# Patient Record
Sex: MALE | Race: WHITE | NOT HISPANIC OR LATINO
[De-identification: names, ages, dates, MRNs, and addresses within clinical notes are randomized per-mention and may not be internally consistent; named-entity substitution may affect disease eponyms.]

---

## 2017-04-19 ENCOUNTER — RECORD ABSTRACTING (OUTPATIENT)
Age: 72
End: 2017-04-19

## 2017-04-19 DIAGNOSIS — Z78.9 OTHER SPECIFIED HEALTH STATUS: ICD-10-CM

## 2017-04-19 DIAGNOSIS — C14.0 MALIGNANT NEOPLASM OF PHARYNX, UNSPECIFIED: ICD-10-CM

## 2017-04-19 RX ORDER — ATORVASTATIN CALCIUM 80 MG/1
TABLET, FILM COATED ORAL
Refills: 0 | Status: ACTIVE | COMMUNITY

## 2017-04-19 RX ORDER — ASPIRIN 325 MG/1
TABLET, FILM COATED ORAL
Refills: 0 | Status: ACTIVE | COMMUNITY

## 2017-06-14 ENCOUNTER — APPOINTMENT (OUTPATIENT)
Dept: UROLOGY | Facility: CLINIC | Age: 72
End: 2017-06-14

## 2017-06-14 VITALS
SYSTOLIC BLOOD PRESSURE: 122 MMHG | WEIGHT: 180 LBS | BODY MASS INDEX: 29.99 KG/M2 | HEART RATE: 81 BPM | HEIGHT: 65 IN | DIASTOLIC BLOOD PRESSURE: 71 MMHG

## 2017-12-06 ENCOUNTER — APPOINTMENT (OUTPATIENT)
Dept: UROLOGY | Facility: CLINIC | Age: 72
End: 2017-12-06
Payer: MEDICARE

## 2017-12-06 VITALS
BODY MASS INDEX: 29.99 KG/M2 | HEART RATE: 90 BPM | DIASTOLIC BLOOD PRESSURE: 84 MMHG | WEIGHT: 180 LBS | HEIGHT: 65 IN | SYSTOLIC BLOOD PRESSURE: 148 MMHG

## 2017-12-06 DIAGNOSIS — Z00.00 ENCOUNTER FOR GENERAL ADULT MEDICAL EXAMINATION W/OUT ABNORMAL FINDINGS: ICD-10-CM

## 2017-12-06 PROCEDURE — 99213 OFFICE O/P EST LOW 20 MIN: CPT

## 2017-12-06 RX ORDER — FEXOFENADINE HCL 60 MG
TABLET ORAL
Refills: 0 | Status: ACTIVE | COMMUNITY

## 2018-04-23 ENCOUNTER — APPOINTMENT (OUTPATIENT)
Dept: UROLOGY | Facility: CLINIC | Age: 73
End: 2018-04-23
Payer: MEDICARE

## 2018-04-23 VITALS
HEART RATE: 80 BPM | HEIGHT: 65 IN | SYSTOLIC BLOOD PRESSURE: 117 MMHG | BODY MASS INDEX: 29.99 KG/M2 | DIASTOLIC BLOOD PRESSURE: 63 MMHG | WEIGHT: 180 LBS

## 2018-04-23 PROCEDURE — 99213 OFFICE O/P EST LOW 20 MIN: CPT

## 2018-04-23 RX ORDER — SULFAMETHOXAZOLE AND TRIMETHOPRIM 800; 160 MG/1; MG/1
800-160 TABLET ORAL
Qty: 3 | Refills: 0 | Status: ACTIVE | COMMUNITY
Start: 2018-04-23 | End: 1900-01-01

## 2018-05-22 ENCOUNTER — OUTPATIENT (OUTPATIENT)
Dept: OUTPATIENT SERVICES | Facility: HOSPITAL | Age: 73
LOS: 1 days | Discharge: HOME | End: 2018-05-22

## 2018-05-22 ENCOUNTER — APPOINTMENT (OUTPATIENT)
Dept: UROLOGY | Facility: CLINIC | Age: 73
End: 2018-05-22
Payer: MEDICARE

## 2018-05-22 ENCOUNTER — LABORATORY RESULT (OUTPATIENT)
Age: 73
End: 2018-05-22

## 2018-05-22 VITALS
SYSTOLIC BLOOD PRESSURE: 129 MMHG | HEIGHT: 65 IN | DIASTOLIC BLOOD PRESSURE: 77 MMHG | BODY MASS INDEX: 29.99 KG/M2 | WEIGHT: 180 LBS | HEART RATE: 77 BPM

## 2018-05-22 DIAGNOSIS — N40.1 BENIGN PROSTATIC HYPERPLASIA WITH LOWER URINARY TRACT SYMPMS: ICD-10-CM

## 2018-05-22 DIAGNOSIS — N13.8 BENIGN PROSTATIC HYPERPLASIA WITH LOWER URINARY TRACT SYMPMS: ICD-10-CM

## 2018-05-22 DIAGNOSIS — R35.1 NOCTURIA: ICD-10-CM

## 2018-05-22 PROCEDURE — 99213 OFFICE O/P EST LOW 20 MIN: CPT | Mod: 25

## 2018-05-22 PROCEDURE — 76872 US TRANSRECTAL: CPT

## 2018-05-22 PROCEDURE — 55700: CPT

## 2018-05-23 DIAGNOSIS — R89.7 ABNORMAL HISTOLOGICAL FINDINGS IN SPECIMENS FROM OTHER ORGANS, SYSTEMS AND TISSUES: ICD-10-CM

## 2018-06-04 ENCOUNTER — APPOINTMENT (OUTPATIENT)
Dept: UROLOGY | Facility: CLINIC | Age: 73
End: 2018-06-04
Payer: MEDICARE

## 2018-06-04 VITALS
BODY MASS INDEX: 29.99 KG/M2 | DIASTOLIC BLOOD PRESSURE: 77 MMHG | SYSTOLIC BLOOD PRESSURE: 120 MMHG | HEIGHT: 65 IN | WEIGHT: 180 LBS | HEART RATE: 68 BPM

## 2018-06-04 DIAGNOSIS — R97.20 ELEVATED PROSTATE, SPECIFIC ANTIGEN [PSA]: ICD-10-CM

## 2018-06-04 DIAGNOSIS — C61 MALIGNANT NEOPLASM OF PROSTATE: ICD-10-CM

## 2018-06-04 PROCEDURE — 99213 OFFICE O/P EST LOW 20 MIN: CPT

## 2018-06-04 NOTE — HISTORY OF PRESENT ILLNESS
[Urinary Frequency] : urinary frequency [Nocturia] : nocturia [Straining] : straining [None] : None [FreeTextEntry1] : FLOWER AGUSTIN is a 72 year old male with a past medical history of elevated PSA since 2016 and has refused prostate biopsies in  the past because he states his brother has had 12 biopsies and the results are always negative.Last PSA 6.0 , 13 % free PSA on 4/18/2018 . Presents to the office today for a follow up, s/p prostate biopsy on 5/22/2018. Patient denies any blood in stool or urine, states that he did notice brown color in the semen 1 time, 5 days ago. No fever or chills. Finished antibiotics that were ordered. Patient is here to review results of prostate biopsy. \par Prostate biopsy 5/22 /2018.- Prostatic adenocarcinoma 2/12 cores\par -Right lateral apex.- Prostatic adenocarcinoma, [place] score 7 (3+4), involving approx 65 % of material. Diagnosis confirmed by prostate triple stain. \par -Right apex.- Prostatic adenocarcinoma, [place] score 7 (3+4), pending immunostains\par

## 2018-06-04 NOTE — ASSESSMENT
[FreeTextEntry1] : FLOWER AGUSTIN is a 72 year old male with a past medical history of elevated PSA since 2016 and has refused prostate biopsies in  the past because he states his brother has had 12 biopsies and the results are always negative.Last PSA 6.0 , 13 % free PSA on 4/18/2018 . Presents to the office today for a follow up, s/p prostate biopsy on 5/22/2018. Patient denies any blood in stool or urine, states that he did notice brown color in the semen 1 time, 5 days ago. No fever or chills. Finished antibiotics that were ordered. Patient is here to review results of prostate biopsy. \par Prostate biopsy 5/22 /2018.- Prostatic adenocarcinoma 2/12 cores\par -Right lateral apex.- Prostatic adenocarcinoma, [place] score 7 (3+4), involving approx 65 % of material. Diagnosis confirmed by prostate triple stain. \par -Right apex.- Prostatic adenocarcinoma, [place] score 7 (3+4), pending immunostains

## 2018-06-04 NOTE — LETTER HEADER
[Ralph Alicea MD] : Ralph Alicea MD [[address]] : [address] [[address]] : [address] [[place] [address]] : [place] [address] [Tel [phone]] : Tel [phone] [Fax [phone]] : Fax [phone]

## 2018-06-04 NOTE — LETTER BODY
[Dear  ___] : Dear  [unfilled], [Courtesy Letter:] : I had the pleasure of seeing your patient, [unfilled], in my office today. [Please see my note below.] : Please see my note below. [Sincerely,] : Sincerely, [Consult Closing:] : Thank you very much for allowing me to participate in the care of this patient.  If you have any questions, please do not hesitate to contact me. [FreeTextEntry2] : Dr. Itz Sanchez\par [address]\[place] [address] [FreeTextEntry3] : Ralph Alicea MD\par Director of Male Sexual Dysfunction and Urologic Prosthetics

## 2018-06-20 ENCOUNTER — OUTPATIENT (OUTPATIENT)
Dept: OUTPATIENT SERVICES | Facility: HOSPITAL | Age: 73
LOS: 1 days | Discharge: HOME | End: 2018-06-20

## 2018-06-20 ENCOUNTER — APPOINTMENT (OUTPATIENT)
Dept: UROLOGY | Facility: CLINIC | Age: 73
End: 2018-06-20
Payer: MEDICARE

## 2018-06-20 VITALS
WEIGHT: 188 LBS | HEART RATE: 73 BPM | TEMPERATURE: 98 F | BODY MASS INDEX: 31.32 KG/M2 | DIASTOLIC BLOOD PRESSURE: 83 MMHG | RESPIRATION RATE: 18 BRPM | HEIGHT: 65 IN | SYSTOLIC BLOOD PRESSURE: 148 MMHG

## 2018-06-20 DIAGNOSIS — C61 MALIGNANT NEOPLASM OF PROSTATE: ICD-10-CM

## 2018-06-20 PROCEDURE — 99213 OFFICE O/P EST LOW 20 MIN: CPT

## 2018-06-21 DIAGNOSIS — C61 MALIGNANT NEOPLASM OF PROSTATE: ICD-10-CM

## 2018-08-09 ENCOUNTER — APPOINTMENT (OUTPATIENT)
Dept: UROLOGY | Facility: CLINIC | Age: 73
End: 2018-08-09

## 2021-08-21 ENCOUNTER — INPATIENT (INPATIENT)
Facility: HOSPITAL | Age: 76
LOS: 4 days | Discharge: HOME HEALTH | End: 2021-08-26
Attending: INTERNAL MEDICINE | Admitting: INTERNAL MEDICINE
Payer: MEDICARE

## 2021-08-21 VITALS
DIASTOLIC BLOOD PRESSURE: 76 MMHG | RESPIRATION RATE: 19 BRPM | WEIGHT: 179.02 LBS | OXYGEN SATURATION: 99 % | HEART RATE: 80 BPM | SYSTOLIC BLOOD PRESSURE: 146 MMHG

## 2021-08-21 DIAGNOSIS — Z90.79 ACQUIRED ABSENCE OF OTHER GENITAL ORGAN(S): Chronic | ICD-10-CM

## 2021-08-21 DIAGNOSIS — Z96.642 PRESENCE OF LEFT ARTIFICIAL HIP JOINT: Chronic | ICD-10-CM

## 2021-08-21 LAB
ALBUMIN SERPL ELPH-MCNC: 4.5 G/DL — SIGNIFICANT CHANGE UP (ref 3.5–5.2)
ALP SERPL-CCNC: 75 U/L — SIGNIFICANT CHANGE UP (ref 30–115)
ALT FLD-CCNC: 11 U/L — SIGNIFICANT CHANGE UP (ref 0–41)
ANION GAP SERPL CALC-SCNC: 11 MMOL/L — SIGNIFICANT CHANGE UP (ref 7–14)
AST SERPL-CCNC: 16 U/L — SIGNIFICANT CHANGE UP (ref 0–41)
BASOPHILS # BLD AUTO: 0.04 K/UL — SIGNIFICANT CHANGE UP (ref 0–0.2)
BASOPHILS NFR BLD AUTO: 0.6 % — SIGNIFICANT CHANGE UP (ref 0–1)
BILIRUB SERPL-MCNC: 0.2 MG/DL — SIGNIFICANT CHANGE UP (ref 0.2–1.2)
BUN SERPL-MCNC: 16 MG/DL — SIGNIFICANT CHANGE UP (ref 10–20)
CALCIUM SERPL-MCNC: 9.7 MG/DL — SIGNIFICANT CHANGE UP (ref 8.5–10.1)
CHLORIDE SERPL-SCNC: 104 MMOL/L — SIGNIFICANT CHANGE UP (ref 98–110)
CO2 SERPL-SCNC: 26 MMOL/L — SIGNIFICANT CHANGE UP (ref 17–32)
CREAT SERPL-MCNC: 1.2 MG/DL — SIGNIFICANT CHANGE UP (ref 0.7–1.5)
EOSINOPHIL # BLD AUTO: 0.22 K/UL — SIGNIFICANT CHANGE UP (ref 0–0.7)
EOSINOPHIL NFR BLD AUTO: 3.1 % — SIGNIFICANT CHANGE UP (ref 0–8)
GLUCOSE SERPL-MCNC: 132 MG/DL — HIGH (ref 70–99)
HCT VFR BLD CALC: 44.8 % — SIGNIFICANT CHANGE UP (ref 42–52)
HGB BLD-MCNC: 14.7 G/DL — SIGNIFICANT CHANGE UP (ref 14–18)
IMM GRANULOCYTES NFR BLD AUTO: 0.4 % — HIGH (ref 0.1–0.3)
LIDOCAIN IGE QN: 52 U/L — SIGNIFICANT CHANGE UP (ref 7–60)
LYMPHOCYTES # BLD AUTO: 1.26 K/UL — SIGNIFICANT CHANGE UP (ref 1.2–3.4)
LYMPHOCYTES # BLD AUTO: 18 % — LOW (ref 20.5–51.1)
MAGNESIUM SERPL-MCNC: 1.9 MG/DL — SIGNIFICANT CHANGE UP (ref 1.8–2.4)
MCHC RBC-ENTMCNC: 30.6 PG — SIGNIFICANT CHANGE UP (ref 27–31)
MCHC RBC-ENTMCNC: 32.8 G/DL — SIGNIFICANT CHANGE UP (ref 32–37)
MCV RBC AUTO: 93.3 FL — SIGNIFICANT CHANGE UP (ref 80–94)
MONOCYTES # BLD AUTO: 0.7 K/UL — HIGH (ref 0.1–0.6)
MONOCYTES NFR BLD AUTO: 10 % — HIGH (ref 1.7–9.3)
NEUTROPHILS # BLD AUTO: 4.74 K/UL — SIGNIFICANT CHANGE UP (ref 1.4–6.5)
NEUTROPHILS NFR BLD AUTO: 67.9 % — SIGNIFICANT CHANGE UP (ref 42.2–75.2)
NRBC # BLD: 0 /100 WBCS — SIGNIFICANT CHANGE UP (ref 0–0)
NT-PROBNP SERPL-SCNC: 66 PG/ML — SIGNIFICANT CHANGE UP (ref 0–300)
PLATELET # BLD AUTO: 201 K/UL — SIGNIFICANT CHANGE UP (ref 130–400)
POTASSIUM SERPL-MCNC: 4.1 MMOL/L — SIGNIFICANT CHANGE UP (ref 3.5–5)
POTASSIUM SERPL-SCNC: 4.1 MMOL/L — SIGNIFICANT CHANGE UP (ref 3.5–5)
PROT SERPL-MCNC: 6.9 G/DL — SIGNIFICANT CHANGE UP (ref 6–8)
RBC # BLD: 4.8 M/UL — SIGNIFICANT CHANGE UP (ref 4.7–6.1)
RBC # FLD: 12.4 % — SIGNIFICANT CHANGE UP (ref 11.5–14.5)
SARS-COV-2 RNA SPEC QL NAA+PROBE: SIGNIFICANT CHANGE UP
SODIUM SERPL-SCNC: 141 MMOL/L — SIGNIFICANT CHANGE UP (ref 135–146)
TROPONIN T SERPL-MCNC: 0.12 NG/ML — CRITICAL HIGH
TROPONIN T SERPL-MCNC: 0.26 NG/ML — CRITICAL HIGH
TROPONIN T SERPL-MCNC: <0.01 NG/ML — SIGNIFICANT CHANGE UP
WBC # BLD: 6.99 K/UL — SIGNIFICANT CHANGE UP (ref 4.8–10.8)
WBC # FLD AUTO: 6.99 K/UL — SIGNIFICANT CHANGE UP (ref 4.8–10.8)

## 2021-08-21 PROCEDURE — 93010 ELECTROCARDIOGRAM REPORT: CPT

## 2021-08-21 PROCEDURE — 99223 1ST HOSP IP/OBS HIGH 75: CPT

## 2021-08-21 PROCEDURE — 99285 EMERGENCY DEPT VISIT HI MDM: CPT

## 2021-08-21 PROCEDURE — 71045 X-RAY EXAM CHEST 1 VIEW: CPT | Mod: 26

## 2021-08-21 RX ORDER — ENOXAPARIN SODIUM 100 MG/ML
80 INJECTION SUBCUTANEOUS
Refills: 0 | Status: DISCONTINUED | OUTPATIENT
Start: 2021-08-21 | End: 2021-08-22

## 2021-08-21 RX ORDER — ASPIRIN/CALCIUM CARB/MAGNESIUM 324 MG
0 TABLET ORAL
Qty: 0 | Refills: 0 | DISCHARGE

## 2021-08-21 RX ORDER — ASPIRIN/CALCIUM CARB/MAGNESIUM 324 MG
81 TABLET ORAL DAILY
Refills: 0 | Status: DISCONTINUED | OUTPATIENT
Start: 2021-08-21 | End: 2021-08-24

## 2021-08-21 RX ORDER — PANTOPRAZOLE SODIUM 20 MG/1
1 TABLET, DELAYED RELEASE ORAL
Qty: 0 | Refills: 0 | DISCHARGE

## 2021-08-21 RX ORDER — FAMOTIDINE 10 MG/ML
0 INJECTION INTRAVENOUS
Qty: 0 | Refills: 0 | DISCHARGE

## 2021-08-21 RX ORDER — NITROGLYCERIN 6.5 MG
0.4 CAPSULE, EXTENDED RELEASE ORAL
Refills: 0 | Status: COMPLETED | OUTPATIENT
Start: 2021-08-21 | End: 2021-08-22

## 2021-08-21 RX ORDER — ENOXAPARIN SODIUM 100 MG/ML
40 INJECTION SUBCUTANEOUS AT BEDTIME
Refills: 0 | Status: DISCONTINUED | OUTPATIENT
Start: 2021-08-21 | End: 2021-08-21

## 2021-08-21 RX ORDER — FAMOTIDINE 10 MG/ML
20 INJECTION INTRAVENOUS ONCE
Refills: 0 | Status: COMPLETED | OUTPATIENT
Start: 2021-08-21 | End: 2021-08-21

## 2021-08-21 RX ORDER — ATORVASTATIN CALCIUM 80 MG/1
10 TABLET, FILM COATED ORAL AT BEDTIME
Refills: 0 | Status: DISCONTINUED | OUTPATIENT
Start: 2021-08-21 | End: 2021-08-23

## 2021-08-21 RX ORDER — ASPIRIN/CALCIUM CARB/MAGNESIUM 324 MG
324 TABLET ORAL ONCE
Refills: 0 | Status: COMPLETED | OUTPATIENT
Start: 2021-08-21 | End: 2021-08-21

## 2021-08-21 RX ORDER — PANTOPRAZOLE SODIUM 20 MG/1
40 TABLET, DELAYED RELEASE ORAL
Refills: 0 | Status: DISCONTINUED | OUTPATIENT
Start: 2021-08-21 | End: 2021-08-26

## 2021-08-21 RX ORDER — TICAGRELOR 90 MG/1
180 TABLET ORAL ONCE
Refills: 0 | Status: COMPLETED | OUTPATIENT
Start: 2021-08-21 | End: 2021-08-21

## 2021-08-21 RX ORDER — METOPROLOL TARTRATE 50 MG
12.5 TABLET ORAL
Refills: 0 | Status: DISCONTINUED | OUTPATIENT
Start: 2021-08-21 | End: 2021-08-24

## 2021-08-21 RX ADMIN — TICAGRELOR 180 MILLIGRAM(S): 90 TABLET ORAL at 17:32

## 2021-08-21 RX ADMIN — Medication 12.5 MILLIGRAM(S): at 21:29

## 2021-08-21 RX ADMIN — FAMOTIDINE 100 MILLIGRAM(S): 10 INJECTION INTRAVENOUS at 08:52

## 2021-08-21 RX ADMIN — Medication 324 MILLIGRAM(S): at 08:52

## 2021-08-21 RX ADMIN — ATORVASTATIN CALCIUM 10 MILLIGRAM(S): 80 TABLET, FILM COATED ORAL at 21:00

## 2021-08-21 RX ADMIN — Medication 0.4 MILLIGRAM(S): at 22:55

## 2021-08-21 RX ADMIN — Medication 81 MILLIGRAM(S): at 17:17

## 2021-08-21 RX ADMIN — ENOXAPARIN SODIUM 80 MILLIGRAM(S): 100 INJECTION SUBCUTANEOUS at 17:31

## 2021-08-21 NOTE — CHART NOTE - NSCHARTNOTEFT_GEN_A_CORE
Notified that patient's troponin is 0.12. Currently patient endorses minimal symptoms. No events on tele noted. Will start NSTEMI treatment. He already received  in the ED. Will load brilinta 180, and give dose of therapeutic lovenox. Cardiology (Dr. Ba) to f/u. May need LHC this admission to assess coronary arteries

## 2021-08-21 NOTE — ED PROVIDER NOTE - OBJECTIVE STATEMENT
Pt is a 77y/o male with a pmhx of HLD, GERD presents today for eval of atraumatic left arm pain that woke pt from sleep approx 630am this morning with associated midsternal chest burning that has been constant with no aggravating/alleviating factors. Pt denies fever, chills, cough, weakness, numbness, SB, Diaphoresis.  Pt admits to taking pill for ED last night.

## 2021-08-21 NOTE — H&P ADULT - NSHPPHYSICALEXAM_GEN_ALL_CORE
PHYSICAL EXAM:  GENERAL: NAD, well-developed  HEAD:  Atraumatic, Normocephalic  EYES: EOMI, PERRLA, conjunctiva and sclera clear  NECK: Supple, No JVD  CHEST/LUNG: Clear to auscultation bilaterally; No wheeze  HEART: Regular rate and rhythm; No murmurs, rubs, or gallops  ABDOMEN: Soft, Nontender, Nondistended; Bowel sounds present  EXTREMITIES:  2+ Peripheral Pulses, No clubbing, cyanosis, or edema  PSYCH: AAOx3  SKIN: No rashes or lesions

## 2021-08-21 NOTE — ED PROVIDER NOTE - LANGUAGE ASSISTANCE NEEDED
No-Patient/Caregiver offered and refused free interpretation services. Bilobed Transposition Flap Text: The defect edges were debeveled with a #15 scalpel blade.  Given the location of the defect and the proximity to free margins a bilobed transposition flap was deemed most appropriate.  Using a sterile surgical marker, an appropriate bilobe flap drawn around the defect.    The area thus outlined was incised deep to adipose tissue with a #15 scalpel blade.  The skin margins were undermined to an appropriate distance in all directions utilizing iris scissors.

## 2021-08-21 NOTE — H&P ADULT - HISTORY OF PRESENT ILLNESS
76 yr old m w/a  pmh significant for HLD and GERD who presents with chest pain. He endorses he woke up around 4:30 Am with left arm pain. Also endorses associated burning like chest pain, midsternal location radiating to neck. Pt also reports taking ED medication yesterday. Pt denies any sob, nausea, vomiting, fevers, chills or any other complaints.  76 yr old m w/a  pmh significant for HLD and GERD who presents with chest pain. He endorses he woke up around 4:30 Am with left arm pain. Also endorses associated burning like chest pain, midsternal location radiating to neck. Pt also reports taking ED medication yesterday. Pt denies any sob, nausea, vomiting, fevers, chills or any other complaints.   He does mention he was mowing the lawn yesterday afternoon.

## 2021-08-21 NOTE — ED ADULT NURSE NOTE - CHPI ED NUR SYMPTOMS POS
Health Maintenance Summary     Topic Due On Due Status Completed On    Immunization - TDAP Pregnancy  Hidden     IMMUNIZATION - DTaP/Tdap/Td Dec 10, 2022 Not Due Dec 10, 2012    Immunization-Influenza  Completed Oct 16, 2017          Patient is up to date, no discussion needed.   Burning in mid chest with pain down left arm

## 2021-08-21 NOTE — H&P ADULT - ASSESSMENT
76 yr old m w/a  pmh significant for HLD and GERD who presents with chest pain.    #Chest pain  r/o ACS  Likely due to GERD vs musculoskeletal etiology  States he had a stress test 1 year ago with. Dr. Ba which was unremarkable  EKG in ED was unremarkable for ischemia  - Will trend cardiac enzymes  - Monitor on telemetry  - Protonix 40mg BID  - Tylenol for pain  - Cont ASA 81mg qD    #HLD  - Cont atorvastatin    DVT PPX, heparin    Anticipate dc in 24h after ACS is ruled out.   76 yr old m w/a  pmh significant for HLD and GERD who presents with chest pain.    #Chest pain  r/o ACS  Likely due to GERD and/or musculoskeletal etiology  States he had a stress test 1 year ago with. Dr. Ba which was unremarkable  EKG in ED was unremarkable for ischemia  - Will trend cardiac enzymes  - Monitor on telemetry  - Protonix 40mg qD  - Tylenol for pain  - Cont ASA 81mg qD    #HLD  - Cont atorvastatin    DVT PPX, heparin    Anticipate dc in 24h after ACS is ruled out.

## 2021-08-21 NOTE — ED PROVIDER NOTE - ATTENDING CONTRIBUTION TO CARE
76 yr old m w/a  pmh significant for HLD and GERD who presents with chest pain. Pt states that the chest pain started at ~ 4:30 am and radiated down his L arm. Pt states that he was also having burning like pain in the chest which also radiates to the arm. Pt also reports taking ED medication yesterday. Pt denies any sob, nausea, vomiting, fevers, chills or any other complaints.     VITAL SIGNS: I have reviewed nursing notes and confirm.  CONSTITUTIONAL: non-toxic, well appearing  SKIN: no rash, no petechiae.  EYES: EOMI, pink conjunctiva, anicteric  ENT: tongue midline, no exudates, MMM  NECK: Supple; no meningismus, no JVD  CARD: RRR, no murmurs, equal radial pulses bilaterally 2+  RESP: CTAB, no respiratory distress  ABD: Soft, non-tender, non-distended, no peritoneal signs, no HSM, no CVA tenderness  EXT: Normal ROM x4. No edema. No calves tenderness  NEURO: Alert, oriented. CN2-12 intact, equal strength bilaterally.  PSYCH: Cooperative, appropriate.    a/p  76 yr old m that presents with chest pain   -labs  -ekg  -cxr  -admit

## 2021-08-21 NOTE — ED PROVIDER NOTE - PHYSICAL EXAMINATION
VITAL SIGNS: I have reviewed nursing notes and confirm.  CONSTITUTIONAL: Well-developed; well-nourished; in no acute distress.   SKIN: skin exam is warm and dry, no acute rash.    HEAD: Normocephalic; atraumatic.  EYES: conjunctiva and sclera clear.  ENT: No nasal discharge; airway clear.  NECK: Supple; non tender.  CARD: S1, S2 normal; no murmurs, gallops, or rubs. Regular rate and rhythm.   RESP: No wheezes, rales or rhonchi.  ABD: Normal bowel sounds; soft; non-distended; non-tender  EXT: Normal ROM.  No clubbing, cyanosis or edema.   LYMPH: No acute cervical adenopathy.  NEURO: Alert, oriented, grossly unremarkable

## 2021-08-21 NOTE — CHART NOTE - NSCHARTNOTEFT_GEN_A_CORE
Patient with midsternal pain radiating to back along with some ( Patient with midsternal pain radiating to back along with some left arm numbness (though less so then previous episode but otherwise similar) Patient with midsternal pain radiating to back along with some left arm numbness (though less so then previous episode but otherwise similar). Spoke to intensivist, since troponin is rising, will transfer to CCU Telemetry

## 2021-08-22 LAB
ANION GAP SERPL CALC-SCNC: 13 MMOL/L — SIGNIFICANT CHANGE UP (ref 7–14)
BUN SERPL-MCNC: 10 MG/DL — SIGNIFICANT CHANGE UP (ref 10–20)
CALCIUM SERPL-MCNC: 9.7 MG/DL — SIGNIFICANT CHANGE UP (ref 8.5–10.1)
CHLORIDE SERPL-SCNC: 106 MMOL/L — SIGNIFICANT CHANGE UP (ref 98–110)
CHOLEST SERPL-MCNC: 158 MG/DL — SIGNIFICANT CHANGE UP
CK MB CFR SERPL CALC: 114.8 NG/ML — HIGH (ref 0.6–6.3)
CK MB CFR SERPL CALC: 87.5 NG/ML — HIGH (ref 0.6–6.3)
CK SERPL-CCNC: 662 U/L — HIGH (ref 0–225)
CK SERPL-CCNC: 772 U/L — HIGH (ref 0–225)
CO2 SERPL-SCNC: 24 MMOL/L — SIGNIFICANT CHANGE UP (ref 17–32)
COVID-19 SPIKE DOMAIN AB INTERP: POSITIVE
COVID-19 SPIKE DOMAIN ANTIBODY RESULT: >250 U/ML — HIGH
CREAT SERPL-MCNC: 1 MG/DL — SIGNIFICANT CHANGE UP (ref 0.7–1.5)
GLUCOSE SERPL-MCNC: 129 MG/DL — HIGH (ref 70–99)
HCT VFR BLD CALC: 42.9 % — SIGNIFICANT CHANGE UP (ref 42–52)
HDLC SERPL-MCNC: 69 MG/DL — SIGNIFICANT CHANGE UP
HGB BLD-MCNC: 14.3 G/DL — SIGNIFICANT CHANGE UP (ref 14–18)
LIPID PNL WITH DIRECT LDL SERPL: 69 MG/DL — SIGNIFICANT CHANGE UP
MCHC RBC-ENTMCNC: 30.2 PG — SIGNIFICANT CHANGE UP (ref 27–31)
MCHC RBC-ENTMCNC: 33.3 G/DL — SIGNIFICANT CHANGE UP (ref 32–37)
MCV RBC AUTO: 90.7 FL — SIGNIFICANT CHANGE UP (ref 80–94)
NON HDL CHOLESTEROL: 89 MG/DL — SIGNIFICANT CHANGE UP
NRBC # BLD: 0 /100 WBCS — SIGNIFICANT CHANGE UP (ref 0–0)
PLATELET # BLD AUTO: 196 K/UL — SIGNIFICANT CHANGE UP (ref 130–400)
POTASSIUM SERPL-MCNC: 4.1 MMOL/L — SIGNIFICANT CHANGE UP (ref 3.5–5)
POTASSIUM SERPL-SCNC: 4.1 MMOL/L — SIGNIFICANT CHANGE UP (ref 3.5–5)
RBC # BLD: 4.73 M/UL — SIGNIFICANT CHANGE UP (ref 4.7–6.1)
RBC # FLD: 12.7 % — SIGNIFICANT CHANGE UP (ref 11.5–14.5)
SARS-COV-2 IGG+IGM SERPL QL IA: >250 U/ML — HIGH
SARS-COV-2 IGG+IGM SERPL QL IA: POSITIVE
SODIUM SERPL-SCNC: 143 MMOL/L — SIGNIFICANT CHANGE UP (ref 135–146)
TRIGL SERPL-MCNC: 145 MG/DL — SIGNIFICANT CHANGE UP
TROPONIN T SERPL-MCNC: 0.65 NG/ML — CRITICAL HIGH
TROPONIN T SERPL-MCNC: 0.87 NG/ML — CRITICAL HIGH
TROPONIN T SERPL-MCNC: 1 NG/ML — CRITICAL HIGH
WBC # BLD: 11.67 K/UL — HIGH (ref 4.8–10.8)
WBC # FLD AUTO: 11.67 K/UL — HIGH (ref 4.8–10.8)

## 2021-08-22 PROCEDURE — 93010 ELECTROCARDIOGRAM REPORT: CPT

## 2021-08-22 PROCEDURE — 99222 1ST HOSP IP/OBS MODERATE 55: CPT

## 2021-08-22 PROCEDURE — 99497 ADVNCD CARE PLAN 30 MIN: CPT | Mod: 25

## 2021-08-22 PROCEDURE — 99233 SBSQ HOSP IP/OBS HIGH 50: CPT

## 2021-08-22 RX ORDER — TICAGRELOR 90 MG/1
90 TABLET ORAL EVERY 12 HOURS
Refills: 0 | Status: DISCONTINUED | OUTPATIENT
Start: 2021-08-22 | End: 2021-08-22

## 2021-08-22 RX ORDER — ENOXAPARIN SODIUM 100 MG/ML
80 INJECTION SUBCUTANEOUS
Refills: 0 | Status: DISCONTINUED | OUTPATIENT
Start: 2021-08-22 | End: 2021-08-24

## 2021-08-22 RX ORDER — SODIUM CHLORIDE 9 MG/ML
1000 INJECTION INTRAMUSCULAR; INTRAVENOUS; SUBCUTANEOUS
Refills: 0 | Status: DISCONTINUED | OUTPATIENT
Start: 2021-08-22 | End: 2021-08-23

## 2021-08-22 RX ADMIN — Medication 81 MILLIGRAM(S): at 11:51

## 2021-08-22 RX ADMIN — Medication 12.5 MILLIGRAM(S): at 17:36

## 2021-08-22 RX ADMIN — ENOXAPARIN SODIUM 80 MILLIGRAM(S): 100 INJECTION SUBCUTANEOUS at 17:36

## 2021-08-22 RX ADMIN — Medication 0.4 MILLIGRAM(S): at 03:46

## 2021-08-22 RX ADMIN — ENOXAPARIN SODIUM 80 MILLIGRAM(S): 100 INJECTION SUBCUTANEOUS at 05:27

## 2021-08-22 RX ADMIN — PANTOPRAZOLE SODIUM 40 MILLIGRAM(S): 20 TABLET, DELAYED RELEASE ORAL at 05:27

## 2021-08-22 RX ADMIN — ATORVASTATIN CALCIUM 10 MILLIGRAM(S): 80 TABLET, FILM COATED ORAL at 21:42

## 2021-08-22 RX ADMIN — Medication 0.4 MILLIGRAM(S): at 03:40

## 2021-08-22 NOTE — PROGRESS NOTE ADULT - SUBJECTIVE AND OBJECTIVE BOX
SUBJECTIVE:    Patient is a 76y old Male who presents with a chief complaint of Chest pain (22 Aug 2021 06:36)    Currently admitted to medicine with the primary diagnosis of Chest pain     Today is hospital day 1d. This morning he is resting comfortably in bed and reports no new issues or overnight events.     PAST MEDICAL & SURGICAL HISTORY  High cholesterol    GERD (gastroesophageal reflux disease)    H/O prostatectomy    History of left hip replacement      SOCIAL HISTORY:  Negative for smoking/alcohol/drug use.     ALLERGIES:  No Known Allergies    MEDICATIONS:  STANDING MEDICATIONS  aspirin  chewable 81 milliGRAM(s) Oral daily  atorvastatin 10 milliGRAM(s) Oral at bedtime  enoxaparin Injectable 80 milliGRAM(s) SubCutaneous two times a day  metoprolol tartrate 12.5 milliGRAM(s) Oral two times a day  pantoprazole    Tablet 40 milliGRAM(s) Oral before breakfast  sodium chloride 0.9%. 1000 milliLiter(s) IV Continuous <Continuous>    PRN MEDICATIONS    VITALS:   T(F): 97.7  HR: 54  BP: 118/61  RR: 20  SpO2: 99%    LABS:                        14.3   11.67 )-----------( 196      ( 22 Aug 2021 06:44 )             42.9     08-22    143  |  106  |  10  ----------------------------<  129<H>  4.1   |  24  |  1.0    Ca    9.7      22 Aug 2021 06:44  Mg     1.9     08-21    TPro  6.9  /  Alb  4.5  /  TBili  0.2  /  DBili  x   /  AST  16  /  ALT  11  /  AlkPhos  75  08-21      Troponin T, Serum: 0.65 ng/mL *HH* (08-22-21 @ 06:44)  Creatine Kinase, Serum: 772 U/L *H* (08-22-21 @ 06:44)  Troponin T, Serum: 0.26 ng/mL *HH* (08-21-21 @ 21:25)  Troponin T, Serum: 0.12 ng/mL *HH* (08-21-21 @ 15:46)      CARDIAC MARKERS ( 22 Aug 2021 06:44 )  x     / 0.65 ng/mL / 772 U/L / x     / 114.8 ng/mL  CARDIAC MARKERS ( 21 Aug 2021 21:25 )  x     / 0.26 ng/mL / x     / x     / x      CARDIAC MARKERS ( 21 Aug 2021 15:46 )  x     / 0.12 ng/mL / x     / x     / x      CARDIAC MARKERS ( 21 Aug 2021 08:51 )  x     / <0.01 ng/mL / x     / x     / x          RADIOLOGY:  Xray Chest 1 View- PORTABLE-Urgent (08.21.21): No radiographic evidence of acute cardiopulmonary disease.      PHYSICAL EXAM:  GEN: No acute distress  LUNGS: Clear to auscultation bilaterally   HEART: S1/S2 present. RRR.   ABD: Soft, non-tender, non-distended. Bowel sounds present  EXT: NC/NC/NE/2+PP/RODRIGUEZ  NEURO: AAOX3, CN intact, motor and sensory intact  SKIN: intact       SUBJECTIVE:    Patient is a 76y old Male who presents with a chief complaint of Chest pain (22 Aug 2021 06:36)    Currently admitted to medicine with the primary diagnosis of Chest pain     Today is hospital day 1d. This morning he is resting comfortably in bed and reports no new issues or overnight events. Wife at bedside, all questions and concerns were addressed.     PAST MEDICAL & SURGICAL HISTORY  High cholesterol    GERD (gastroesophageal reflux disease)    H/O prostatectomy    History of left hip replacement      SOCIAL HISTORY:  Negative for smoking/alcohol/drug use.     ALLERGIES:  No Known Allergies    MEDICATIONS:  STANDING MEDICATIONS  aspirin  chewable 81 milliGRAM(s) Oral daily  atorvastatin 10 milliGRAM(s) Oral at bedtime  enoxaparin Injectable 80 milliGRAM(s) SubCutaneous two times a day  metoprolol tartrate 12.5 milliGRAM(s) Oral two times a day  pantoprazole    Tablet 40 milliGRAM(s) Oral before breakfast  sodium chloride 0.9%. 1000 milliLiter(s) IV Continuous <Continuous>    PRN MEDICATIONS    VITALS:   T(F): 97.7  HR: 54  BP: 118/61  RR: 20  SpO2: 99%    LABS:                        14.3   11.67 )-----------( 196      ( 22 Aug 2021 06:44 )             42.9     08-22    143  |  106  |  10  ----------------------------<  129<H>  4.1   |  24  |  1.0    Ca    9.7      22 Aug 2021 06:44  Mg     1.9     08-21    TPro  6.9  /  Alb  4.5  /  TBili  0.2  /  DBili  x   /  AST  16  /  ALT  11  /  AlkPhos  75  08-21      Troponin T, Serum: 0.65 ng/mL *HH* (08-22-21 @ 06:44)  Creatine Kinase, Serum: 772 U/L *H* (08-22-21 @ 06:44)  Troponin T, Serum: 0.26 ng/mL *HH* (08-21-21 @ 21:25)  Troponin T, Serum: 0.12 ng/mL *HH* (08-21-21 @ 15:46)      CARDIAC MARKERS ( 22 Aug 2021 06:44 )  x     / 0.65 ng/mL / 772 U/L / x     / 114.8 ng/mL  CARDIAC MARKERS ( 21 Aug 2021 21:25 )  x     / 0.26 ng/mL / x     / x     / x      CARDIAC MARKERS ( 21 Aug 2021 15:46 )  x     / 0.12 ng/mL / x     / x     / x      CARDIAC MARKERS ( 21 Aug 2021 08:51 )  x     / <0.01 ng/mL / x     / x     / x          RADIOLOGY:  Xray Chest 1 View- PORTABLE-Urgent (08.21.21): No radiographic evidence of acute cardiopulmonary disease.      PHYSICAL EXAM:  GEN: No acute distress  LUNGS: Clear to auscultation bilaterally   HEART: S1/S2 present. RRR.   ABD: Soft, non-tender, non-distended. Bowel sounds present  EXT: NC/NC/NE/2+PP/RODRIGUEZ  NEURO: AAOX3, CN intact, motor and sensory intact  SKIN: intact

## 2021-08-22 NOTE — PROGRESS NOTE ADULT - ASSESSMENT
76 yr old male with hx of HLD and GERD admitted for chest pain. Patient upgraded to CCU due to rising trop.    # NSTEMI  - EKG: NSR with incomplete right BBB  - trop 0.12 --> 0.26 --> 0.65   - c/w ASA, metoprolol, statin and therapeutic Lovenox   - possible LHC in am (hold Lovenox in AM)  - NPO after midnight   - check ECHO  - cardiology following     # HLD  - c/w statin     # GERD  - c/w     # DVT ppx  - start therapeutic Lovenox     # DASH Diet    # Ambulate as tolerated    # Full Code 76 yr old male with hx of HLD and GERD admitted for chest pain. Patient upgraded to CCU due to rising trop.    # NSTEMI  - EKG: NSR with incomplete right BBB  - trop 0.12 --> 0.26 --> 0.65   - c/w ASA, metoprolol, statin and therapeutic Lovenox   - possible LHC in am (hold Lovenox in AM)  - NPO after midnight   - check ECHO  - cardiology following     # HLD  - c/w statin     # DVT ppx  - start therapeutic Lovenox     # DASH Diet    # Ambulate as tolerated    # Full Code 76 yr old male with hx of HLD and GERD admitted for chest pain. Patient upgraded to CCU due to rising trop.    # NSTEMI  - EKG: NSR with incomplete right BBB  - trop 0.12 --> 0.26 --> 0.65   - c/w ASA, metoprolol, statin and therapeutic Lovenox   - LHC in am (hold Lovenox in AM)  - NPO after midnight   - check ECHO  - cardiology following     # HLD  - c/w statin     # DVT ppx  - start therapeutic Lovenox     # DASH Diet    # Ambulate as tolerated    # Full Code

## 2021-08-23 LAB
A1C WITH ESTIMATED AVERAGE GLUCOSE RESULT: 5.8 % — HIGH (ref 4–5.6)
ALBUMIN SERPL ELPH-MCNC: 3.8 G/DL — SIGNIFICANT CHANGE UP (ref 3.5–5.2)
ALP SERPL-CCNC: 71 U/L — SIGNIFICANT CHANGE UP (ref 30–115)
ALT FLD-CCNC: 16 U/L — SIGNIFICANT CHANGE UP (ref 0–41)
ANION GAP SERPL CALC-SCNC: 9 MMOL/L — SIGNIFICANT CHANGE UP (ref 7–14)
APTT BLD: 34.3 SEC — SIGNIFICANT CHANGE UP (ref 27–39.2)
AST SERPL-CCNC: 50 U/L — HIGH (ref 0–41)
BASOPHILS # BLD AUTO: 0.04 K/UL — SIGNIFICANT CHANGE UP (ref 0–0.2)
BASOPHILS NFR BLD AUTO: 0.5 % — SIGNIFICANT CHANGE UP (ref 0–1)
BILIRUB SERPL-MCNC: 0.7 MG/DL — SIGNIFICANT CHANGE UP (ref 0.2–1.2)
BUN SERPL-MCNC: 12 MG/DL — SIGNIFICANT CHANGE UP (ref 10–20)
CALCIUM SERPL-MCNC: 9.2 MG/DL — SIGNIFICANT CHANGE UP (ref 8.5–10.1)
CHLORIDE SERPL-SCNC: 105 MMOL/L — SIGNIFICANT CHANGE UP (ref 98–110)
CO2 SERPL-SCNC: 27 MMOL/L — SIGNIFICANT CHANGE UP (ref 17–32)
CREAT SERPL-MCNC: 0.9 MG/DL — SIGNIFICANT CHANGE UP (ref 0.7–1.5)
EOSINOPHIL # BLD AUTO: 0.23 K/UL — SIGNIFICANT CHANGE UP (ref 0–0.7)
EOSINOPHIL NFR BLD AUTO: 2.8 % — SIGNIFICANT CHANGE UP (ref 0–8)
ESTIMATED AVERAGE GLUCOSE: 120 MG/DL — HIGH (ref 68–114)
GLUCOSE SERPL-MCNC: 98 MG/DL — SIGNIFICANT CHANGE UP (ref 70–99)
HCT VFR BLD CALC: 41.9 % — LOW (ref 42–52)
HCV AB S/CO SERPL IA: 0.04 COI — SIGNIFICANT CHANGE UP
HCV AB SERPL-IMP: SIGNIFICANT CHANGE UP
HGB BLD-MCNC: 13.7 G/DL — LOW (ref 14–18)
IMM GRANULOCYTES NFR BLD AUTO: 0.2 % — SIGNIFICANT CHANGE UP (ref 0.1–0.3)
INR BLD: 1.07 RATIO — SIGNIFICANT CHANGE UP (ref 0.65–1.3)
LYMPHOCYTES # BLD AUTO: 1.59 K/UL — SIGNIFICANT CHANGE UP (ref 1.2–3.4)
LYMPHOCYTES # BLD AUTO: 19.2 % — LOW (ref 20.5–51.1)
MAGNESIUM SERPL-MCNC: 1.8 MG/DL — SIGNIFICANT CHANGE UP (ref 1.8–2.4)
MCHC RBC-ENTMCNC: 30.3 PG — SIGNIFICANT CHANGE UP (ref 27–31)
MCHC RBC-ENTMCNC: 32.7 G/DL — SIGNIFICANT CHANGE UP (ref 32–37)
MCV RBC AUTO: 92.7 FL — SIGNIFICANT CHANGE UP (ref 80–94)
MONOCYTES # BLD AUTO: 0.96 K/UL — HIGH (ref 0.1–0.6)
MONOCYTES NFR BLD AUTO: 11.6 % — HIGH (ref 1.7–9.3)
NEUTROPHILS # BLD AUTO: 5.45 K/UL — SIGNIFICANT CHANGE UP (ref 1.4–6.5)
NEUTROPHILS NFR BLD AUTO: 65.7 % — SIGNIFICANT CHANGE UP (ref 42.2–75.2)
NRBC # BLD: 0 /100 WBCS — SIGNIFICANT CHANGE UP (ref 0–0)
PHOSPHATE SERPL-MCNC: 3.5 MG/DL — SIGNIFICANT CHANGE UP (ref 2.1–4.9)
PLATELET # BLD AUTO: 192 K/UL — SIGNIFICANT CHANGE UP (ref 130–400)
POTASSIUM SERPL-MCNC: 4.2 MMOL/L — SIGNIFICANT CHANGE UP (ref 3.5–5)
POTASSIUM SERPL-SCNC: 4.2 MMOL/L — SIGNIFICANT CHANGE UP (ref 3.5–5)
PROT SERPL-MCNC: 6.2 G/DL — SIGNIFICANT CHANGE UP (ref 6–8)
PROTHROM AB SERPL-ACNC: 12.3 SEC — SIGNIFICANT CHANGE UP (ref 9.95–12.87)
RBC # BLD: 4.52 M/UL — LOW (ref 4.7–6.1)
RBC # FLD: 12.6 % — SIGNIFICANT CHANGE UP (ref 11.5–14.5)
SODIUM SERPL-SCNC: 141 MMOL/L — SIGNIFICANT CHANGE UP (ref 135–146)
TROPONIN T SERPL-MCNC: 1.6 NG/ML — CRITICAL HIGH
WBC # BLD: 8.29 K/UL — SIGNIFICANT CHANGE UP (ref 4.8–10.8)
WBC # FLD AUTO: 8.29 K/UL — SIGNIFICANT CHANGE UP (ref 4.8–10.8)

## 2021-08-23 PROCEDURE — 99233 SBSQ HOSP IP/OBS HIGH 50: CPT

## 2021-08-23 PROCEDURE — 93306 TTE W/DOPPLER COMPLETE: CPT | Mod: 26

## 2021-08-23 PROCEDURE — 93010 ELECTROCARDIOGRAM REPORT: CPT

## 2021-08-23 RX ORDER — ATORVASTATIN CALCIUM 80 MG/1
40 TABLET, FILM COATED ORAL AT BEDTIME
Refills: 0 | Status: DISCONTINUED | OUTPATIENT
Start: 2021-08-23 | End: 2021-08-26

## 2021-08-23 RX ADMIN — Medication 12.5 MILLIGRAM(S): at 17:23

## 2021-08-23 RX ADMIN — Medication 81 MILLIGRAM(S): at 11:12

## 2021-08-23 RX ADMIN — ENOXAPARIN SODIUM 80 MILLIGRAM(S): 100 INJECTION SUBCUTANEOUS at 17:23

## 2021-08-23 RX ADMIN — PANTOPRAZOLE SODIUM 40 MILLIGRAM(S): 20 TABLET, DELAYED RELEASE ORAL at 06:48

## 2021-08-23 RX ADMIN — ATORVASTATIN CALCIUM 40 MILLIGRAM(S): 80 TABLET, FILM COATED ORAL at 21:45

## 2021-08-23 RX ADMIN — SODIUM CHLORIDE 75 MILLILITER(S): 9 INJECTION INTRAMUSCULAR; INTRAVENOUS; SUBCUTANEOUS at 00:38

## 2021-08-23 RX ADMIN — Medication 12.5 MILLIGRAM(S): at 05:16

## 2021-08-23 NOTE — PROGRESS NOTE ADULT - ASSESSMENT
76 yr old male with hx of HLD and GERD admitted for chest pain.    # NSTEMI  - Pain   - EKG: NSR with incomplete right BBB  - trop 0.12 --> 0.26 --> 0.65 -->1---> 1.6  - c/w ASA, metoprolol, statin and therapeutic Lovenox   - NPO after midnight   - check ECHO  - cardiology following : plan for Cath tomorrow : NPO after midnight    # HLD  - c/w statin     # DVT ppx  - therapeutic Lovenox     # DASH Diet    # Ambulate as tolerated    # Full Code

## 2021-08-23 NOTE — PROGRESS NOTE ADULT - SUBJECTIVE AND OBJECTIVE BOX
SUBJECTIVE:    Patient is a 76y old Male who presents with a chief complaint of Chest pain (23 Aug 2021 12:06)    Currently admitted to medicine with the primary diagnosis of Chest pain    Today is hospital day 2d. This morning he is resting comfortably in bed and reports no new issues or overnight events.     PAST MEDICAL & SURGICAL HISTORY  High cholesterol    GERD (gastroesophageal reflux disease)    H/O prostatectomy    History of left hip replacement      SOCIAL HISTORY:  Negative for smoking/alcohol/drug use.     ALLERGIES:  No Known Allergies    MEDICATIONS:  STANDING MEDICATIONS  aspirin  chewable 81 milliGRAM(s) Oral daily  atorvastatin 40 milliGRAM(s) Oral at bedtime  enoxaparin Injectable 80 milliGRAM(s) SubCutaneous two times a day  metoprolol tartrate 12.5 milliGRAM(s) Oral two times a day  pantoprazole    Tablet 40 milliGRAM(s) Oral before breakfast  sodium chloride 0.9%. 1000 milliLiter(s) IV Continuous <Continuous>    PRN MEDICATIONS    VITALS:   T(F): 97.3  HR: 60  BP: 111/68  RR: 23  SpO2: 100%    LABS:                        13.7   8.29  )-----------( 192      ( 23 Aug 2021 06:02 )             41.9     08-23    141  |  105  |  12  ----------------------------<  98  4.2   |  27  |  0.9    Ca    9.2      23 Aug 2021 06:02  Phos  3.5     08-23  Mg     1.8     08-23    TPro  6.2  /  Alb  3.8  /  TBili  0.7  /  DBili  x   /  AST  50<H>  /  ALT  16  /  AlkPhos  71  08-23    PT/INR - ( 23 Aug 2021 06:02 )   PT: 12.30 sec;   INR: 1.07 ratio         PTT - ( 23 Aug 2021 06:02 )  PTT:34.3 sec      Troponin T, Serum: 1.60 ng/mL *HH* (08-23-21 @ 06:02)  Troponin T, Serum: 1.00 ng/mL *HH* (08-22-21 @ 15:23)      CARDIAC MARKERS ( 23 Aug 2021 06:02 )  x     / 1.60 ng/mL / x     / x     / x      CARDIAC MARKERS ( 22 Aug 2021 15:23 )  x     / 1.00 ng/mL / x     / x     / x      CARDIAC MARKERS ( 22 Aug 2021 11:43 )  x     / 0.87 ng/mL / 662 U/L / x     / 87.5 ng/mL  CARDIAC MARKERS ( 22 Aug 2021 06:44 )  x     / 0.65 ng/mL / 772 U/L / x     / 114.8 ng/mL  CARDIAC MARKERS ( 21 Aug 2021 21:25 )  x     / 0.26 ng/mL / x     / x     / x      CARDIAC MARKERS ( 21 Aug 2021 15:46 )  x     / 0.12 ng/mL / x     / x     / x          RADIOLOGY:  < from: Xray Chest 1 View- PORTABLE-Urgent (08.21.21 @ 09:43) >  No radiographic evidence of acute cardiopulmonary disease.      PHYSICAL EXAM:  GEN: No acute distress  LUNGS: Clear to auscultation bilaterally   HEART: S1/S2 present. RRR.   ABD: Soft, non-tender, non-distended. Bowel sounds present  EXT: NC/NC/NE/2+PP/RODRIGUEZ  NEURO: AAOX3, CN intact, motor and sensory intact  SKIN: intact

## 2021-08-23 NOTE — PROGRESS NOTE ADULT - ASSESSMENT
Patient with no furter chest pain. He had a NSTEMI. Continue asa beta statin.  He needs a echo. Possible cardiac cath today Check EKG

## 2021-08-23 NOTE — PROGRESS NOTE ADULT - SUBJECTIVE AND OBJECTIVE BOX
24H events:    Patient is a 76y old Male who presents with a chief complaint of Chest pain (23 Aug 2021 07:13)    Primary diagnosis of Chest pain    Today is hospital day 2d. This morning patient was seen and examined at bedside, resting comfortably in bed.      PAST MEDICAL & SURGICAL HISTORY  High cholesterol    GERD (gastroesophageal reflux disease)    H/O prostatectomy    History of left hip replacement      SOCIAL HISTORY:  Social History:      ALLERGIES:  No Known Allergies    MEDICATIONS:  STANDING MEDICATIONS  aspirin  chewable 81 milliGRAM(s) Oral daily  atorvastatin 40 milliGRAM(s) Oral at bedtime  enoxaparin Injectable 80 milliGRAM(s) SubCutaneous two times a day  metoprolol tartrate 12.5 milliGRAM(s) Oral two times a day  pantoprazole    Tablet 40 milliGRAM(s) Oral before breakfast  sodium chloride 0.9%. 1000 milliLiter(s) IV Continuous <Continuous>    PRN MEDICATIONS        LABS:                        13.7   8.29  )-----------( 192      ( 23 Aug 2021 06:02 )             41.9     08-23    141  |  105  |  12  ----------------------------<  98  4.2   |  27  |  0.9    Ca    9.2      23 Aug 2021 06:02  Phos  3.5     08-23  Mg     1.8     08-23    TPro  6.2  /  Alb  3.8  /  TBili  0.7  /  DBili  x   /  AST  50<H>  /  ALT  16  /  AlkPhos  71  08-23    PT/INR - ( 23 Aug 2021 06:02 )   PT: 12.30 sec;   INR: 1.07 ratio         PTT - ( 23 Aug 2021 06:02 )  PTT:34.3 sec      Troponin T, Serum: 1.60 ng/mL *HH* (08-23-21 @ 06:02)  Troponin T, Serum: 1.00 ng/mL *HH* (08-22-21 @ 15:23)      CARDIAC MARKERS ( 23 Aug 2021 06:02 )  x     / 1.60 ng/mL / x     / x     / x      CARDIAC MARKERS ( 22 Aug 2021 15:23 )  x     / 1.00 ng/mL / x     / x     / x      CARDIAC MARKERS ( 22 Aug 2021 11:43 )  x     / 0.87 ng/mL / 662 U/L / x     / 87.5 ng/mL  CARDIAC MARKERS ( 22 Aug 2021 06:44 )  x     / 0.65 ng/mL / 772 U/L / x     / 114.8 ng/mL  CARDIAC MARKERS ( 21 Aug 2021 21:25 )  x     / 0.26 ng/mL / x     / x     / x      CARDIAC MARKERS ( 21 Aug 2021 15:46 )  x     / 0.12 ng/mL / x     / x     / x          RADIOLOGY:  < from: Xray Chest 1 View- PORTABLE-Urgent (08.21.21 @ 09:43) >  Impression:  No radiographic evidence of acute cardiopulmonary disease.    < end of copied text >      VITALS:   T(F): 97.3  HR: 60  BP: 111/68  RR: 23  SpO2: 100%    PHYSICAL EXAM:  GEN: No acute distress  LUNGS: Clear to auscultation bilaterally   HEART: S1/S2 present. RRR.   ABD: Soft, non-tender, non-distended. Bowel sounds present  EXT: NC/NC/NE/2+PP/RODRIGUEZ  NEURO: AAOX3, CN intact, motor and sensory intact  SKIN: intact

## 2021-08-23 NOTE — PROGRESS NOTE ADULT - SUBJECTIVE AND OBJECTIVE BOX
Patient is a 76y old  Male who presents with a chief complaint of Chest pain (22 Aug 2021 11:05)      T(F): 98.7 (08-22-21 @ 23:23), Max: 98.7 (08-22-21 @ 23:23)  HR: 54 (08-23-21 @ 05:17)  BP: 120/62 (08-23-21 @ 05:17)  RR: 20 (08-23-21 @ 05:17)  SpO2: 99% (08-23-21 @ 05:17) (98% - 100%)    PHYSICAL EXAM:  GENERAL: NAD, well-groomed, well-developed  HEAD:  Atraumatic, Normocephalic  EYES: EOMI, PERRLA, conjunctiva and sclera clear  ENMT: No tonsillar erythema, exudates, or enlargement; Moist mucous membranes, Good dentition, No lesions  NECK: Supple, No JVD, Normal thyroid  NERVOUS SYSTEM:  Alert & Oriented X3,  Motor Strength 5/5 B/L upper and lower extremities  CHEST/LUNG: Clear to percussion bilaterally; No rales, rhonchi, wheezing, or rubs  HEART: Regular rate and rhythm; No murmurs, rubs, or gallops  ABDOMEN: Soft, Nontender, Nondistended; Bowel sounds present  EXTREMITIES:   No clubbing, cyanosis, or edema  LYMPH: No lymphadenopathy noted  SKIN: No rashes or lesions    labs  08-22    143  |  106  |  10  ----------------------------<  129<H>  4.1   |  24  |  1.0    Ca    9.7      22 Aug 2021 06:44  Mg     1.9     08-21    TPro  6.9  /  Alb  4.5  /  TBili  0.2  /  DBili  x   /  AST  16  /  ALT  11  /  AlkPhos  75  08-21                          14.3   11.67 )-----------( 196      ( 22 Aug 2021 06:44 )             42.9           Troponin T, Serum: 1.00 ng/mL *HH* (08-22-21 @ 15:23)  Troponin T, Serum: 0.87 ng/mL *HH* (08-22-21 @ 11:43)  Creatine Kinase, Serum: 662 U/L *H* (08-22-21 @ 11:43)      aspirin  chewable 81 milliGRAM(s) Oral daily  atorvastatin 10 milliGRAM(s) Oral at bedtime  enoxaparin Injectable 80 milliGRAM(s) SubCutaneous two times a day  metoprolol tartrate 12.5 milliGRAM(s) Oral two times a day  pantoprazole    Tablet 40 milliGRAM(s) Oral before breakfast  sodium chloride 0.9%. 1000 milliLiter(s) IV Continuous <Continuous>

## 2021-08-23 NOTE — PROGRESS NOTE ADULT - ASSESSMENT
76 yr old male with hx of HLD and GERD admitted for chest pain. Patient upgraded to CCU due to rising trop.    # NSTEMI  - EKG: NSR with incomplete right BBB  - trop 0.12 --> 0.26 --> 0.65 --> 1.0 --> 1.6  - c/w ASA, metoprolol, statin and therapeutic Lovenox   - LHC in am (hold Lovenox in AM)  - NPO after midnight   - check ECHO  - spoke to Dr. Beard --> cath tomorrow     # HLD  - c/w statin     # DVT ppx  - c/w therapeutic Lovenox     # DASH Diet    # Ambulate as tolerated    # Full Code   76 yr old male with hx of HLD and GERD admitted for chest pain. Patient upgraded to CCU due to rising trop.    # NSTEMI  - EKG: NSR with incomplete right BBB  - trop 0.12 --> 0.26 --> 0.65 --> 1.0 --> 1.6  - c/w ASA, metoprolol, statin and therapeutic Lovenox   - LHC in am (hold Lovenox in AM)  - NPO after midnight   - ECHO done --> pending read   - spoke to Dr. Beard --> cath tomorrow     # HLD  - c/w statin     # DVT ppx  - c/w therapeutic Lovenox     # DASH Diet    # Ambulate as tolerated    # Full Code

## 2021-08-24 LAB
ANION GAP SERPL CALC-SCNC: 13 MMOL/L — SIGNIFICANT CHANGE UP (ref 7–14)
APTT BLD: 33 SEC — SIGNIFICANT CHANGE UP (ref 27–39.2)
BASOPHILS # BLD AUTO: 0.03 K/UL — SIGNIFICANT CHANGE UP (ref 0–0.2)
BASOPHILS NFR BLD AUTO: 0.3 % — SIGNIFICANT CHANGE UP (ref 0–1)
BUN SERPL-MCNC: 15 MG/DL — SIGNIFICANT CHANGE UP (ref 10–20)
CALCIUM SERPL-MCNC: 9.6 MG/DL — SIGNIFICANT CHANGE UP (ref 8.5–10.1)
CHLORIDE SERPL-SCNC: 103 MMOL/L — SIGNIFICANT CHANGE UP (ref 98–110)
CO2 SERPL-SCNC: 24 MMOL/L — SIGNIFICANT CHANGE UP (ref 17–32)
CREAT SERPL-MCNC: 1.1 MG/DL — SIGNIFICANT CHANGE UP (ref 0.7–1.5)
EOSINOPHIL # BLD AUTO: 0.19 K/UL — SIGNIFICANT CHANGE UP (ref 0–0.7)
EOSINOPHIL NFR BLD AUTO: 2.2 % — SIGNIFICANT CHANGE UP (ref 0–8)
GLUCOSE SERPL-MCNC: 111 MG/DL — HIGH (ref 70–99)
HCT VFR BLD CALC: 45 % — SIGNIFICANT CHANGE UP (ref 42–52)
HGB BLD-MCNC: 14.8 G/DL — SIGNIFICANT CHANGE UP (ref 14–18)
IMM GRANULOCYTES NFR BLD AUTO: 0.3 % — SIGNIFICANT CHANGE UP (ref 0.1–0.3)
INR BLD: 1.04 RATIO — SIGNIFICANT CHANGE UP (ref 0.65–1.3)
LYMPHOCYTES # BLD AUTO: 1.22 K/UL — SIGNIFICANT CHANGE UP (ref 1.2–3.4)
LYMPHOCYTES # BLD AUTO: 13.9 % — LOW (ref 20.5–51.1)
MAGNESIUM SERPL-MCNC: 1.9 MG/DL — SIGNIFICANT CHANGE UP (ref 1.8–2.4)
MCHC RBC-ENTMCNC: 30.6 PG — SIGNIFICANT CHANGE UP (ref 27–31)
MCHC RBC-ENTMCNC: 32.9 G/DL — SIGNIFICANT CHANGE UP (ref 32–37)
MCV RBC AUTO: 93 FL — SIGNIFICANT CHANGE UP (ref 80–94)
MONOCYTES # BLD AUTO: 1.01 K/UL — HIGH (ref 0.1–0.6)
MONOCYTES NFR BLD AUTO: 11.5 % — HIGH (ref 1.7–9.3)
NEUTROPHILS # BLD AUTO: 6.31 K/UL — SIGNIFICANT CHANGE UP (ref 1.4–6.5)
NEUTROPHILS NFR BLD AUTO: 71.8 % — SIGNIFICANT CHANGE UP (ref 42.2–75.2)
NRBC # BLD: 0 /100 WBCS — SIGNIFICANT CHANGE UP (ref 0–0)
PLATELET # BLD AUTO: 194 K/UL — SIGNIFICANT CHANGE UP (ref 130–400)
POTASSIUM SERPL-MCNC: 4.1 MMOL/L — SIGNIFICANT CHANGE UP (ref 3.5–5)
POTASSIUM SERPL-SCNC: 4.1 MMOL/L — SIGNIFICANT CHANGE UP (ref 3.5–5)
PROTHROM AB SERPL-ACNC: 12 SEC — SIGNIFICANT CHANGE UP (ref 9.95–12.87)
RBC # BLD: 4.84 M/UL — SIGNIFICANT CHANGE UP (ref 4.7–6.1)
RBC # FLD: 12.5 % — SIGNIFICANT CHANGE UP (ref 11.5–14.5)
SODIUM SERPL-SCNC: 140 MMOL/L — SIGNIFICANT CHANGE UP (ref 135–146)
WBC # BLD: 8.79 K/UL — SIGNIFICANT CHANGE UP (ref 4.8–10.8)
WBC # FLD AUTO: 8.79 K/UL — SIGNIFICANT CHANGE UP (ref 4.8–10.8)

## 2021-08-24 PROCEDURE — 93010 ELECTROCARDIOGRAM REPORT: CPT | Mod: 77

## 2021-08-24 PROCEDURE — 99233 SBSQ HOSP IP/OBS HIGH 50: CPT

## 2021-08-24 PROCEDURE — 93010 ELECTROCARDIOGRAM REPORT: CPT

## 2021-08-24 PROCEDURE — 99232 SBSQ HOSP IP/OBS MODERATE 35: CPT

## 2021-08-24 RX ORDER — ACETAMINOPHEN 500 MG
650 TABLET ORAL ONCE
Refills: 0 | Status: COMPLETED | OUTPATIENT
Start: 2021-08-24 | End: 2021-08-24

## 2021-08-24 RX ORDER — ASPIRIN/CALCIUM CARB/MAGNESIUM 324 MG
81 TABLET ORAL DAILY
Refills: 0 | Status: DISCONTINUED | OUTPATIENT
Start: 2021-08-25 | End: 2021-08-26

## 2021-08-24 RX ORDER — ACETAMINOPHEN 500 MG
650 TABLET ORAL EVERY 6 HOURS
Refills: 0 | Status: DISCONTINUED | OUTPATIENT
Start: 2021-08-24 | End: 2021-08-26

## 2021-08-24 RX ORDER — LIDOCAINE 4 G/100G
1 CREAM TOPICAL ONCE
Refills: 0 | Status: COMPLETED | OUTPATIENT
Start: 2021-08-24 | End: 2021-08-24

## 2021-08-24 RX ORDER — METOPROLOL TARTRATE 50 MG
12.5 TABLET ORAL
Refills: 0 | Status: DISCONTINUED | OUTPATIENT
Start: 2021-08-24 | End: 2021-08-25

## 2021-08-24 RX ORDER — TICAGRELOR 90 MG/1
90 TABLET ORAL EVERY 12 HOURS
Refills: 0 | Status: DISCONTINUED | OUTPATIENT
Start: 2021-08-25 | End: 2021-08-26

## 2021-08-24 RX ORDER — TICAGRELOR 90 MG/1
1 TABLET ORAL
Qty: 60 | Refills: 1
Start: 2021-08-24 | End: 2021-10-22

## 2021-08-24 RX ADMIN — Medication 650 MILLIGRAM(S): at 09:30

## 2021-08-24 RX ADMIN — LIDOCAINE 1 PATCH: 4 CREAM TOPICAL at 21:21

## 2021-08-24 RX ADMIN — Medication 12.5 MILLIGRAM(S): at 17:22

## 2021-08-24 RX ADMIN — Medication 650 MILLIGRAM(S): at 18:28

## 2021-08-24 RX ADMIN — Medication 81 MILLIGRAM(S): at 11:32

## 2021-08-24 RX ADMIN — PANTOPRAZOLE SODIUM 40 MILLIGRAM(S): 20 TABLET, DELAYED RELEASE ORAL at 06:03

## 2021-08-24 RX ADMIN — Medication 12.5 MILLIGRAM(S): at 05:56

## 2021-08-24 RX ADMIN — Medication 650 MILLIGRAM(S): at 18:12

## 2021-08-24 RX ADMIN — Medication 650 MILLIGRAM(S): at 09:24

## 2021-08-24 RX ADMIN — ATORVASTATIN CALCIUM 40 MILLIGRAM(S): 80 TABLET, FILM COATED ORAL at 21:27

## 2021-08-24 NOTE — PROGRESS NOTE ADULT - ASSESSMENT
76 yr old male with hx of HLD and GERD admitted for chest pain. Patient upgraded to CCU due to rising trop.    # NSTEMI  - stable   - EKG: NSR with incomplete right BBB  - trop 0.12 --> 0.26 --> 0.65 --> 1.0 --> 1.6  - c/w ASA, metoprolol, statin and therapeutic Lovenox   - TTE Echo Complete w/o Contrast w/ Doppler (08.23.21):Left ventricular ejection fraction, by visual estimation, is 55 to 60%. Mild left ventricular hypertrophy.  - cath today     # HLD  - c/w statin     # DVT ppx  - c/w therapeutic Lovenox     # DASH Diet    # Ambulate as tolerated    # Full Code

## 2021-08-24 NOTE — CONSULT NOTE ADULT - ASSESSMENT
NSTEMI  Hypertension  Hyperlipidemia    REC:  for cardiac cath and possible PCI today.
Assessment -  1. 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 R/O AMI.  2. H/O GERD.   Recommendations  1. Transfer to CCU Telemetry.  2. Aspirin. Statin.  2. Add plavix.  4. sc Lovenox, therapeutic dose.  5. Add PO Lopressor.  6. Protonix po.  7. Repeat troponin in am.  8. Cardiology consult.
Patient took ED med. He awoke with l arm and chest pain. He had NSTEMI. He nneeds asa beta statin lovenox or heparin. He needs repeat blood tests echo ekg. He may need a cardiac cath. Hold am lovenox

## 2021-08-24 NOTE — PROGRESS NOTE ADULT - ASSESSMENT
Patient with no further chest pain. He had a NSTEMI. Continue asa beta statin.  To get cardiac cath today. OOB  to chair . Echo EF 55%.

## 2021-08-24 NOTE — PROGRESS NOTE ADULT - SUBJECTIVE AND OBJECTIVE BOX
24H events:    Patient is a 76y old Male who presents with a chief complaint of Chest pain (24 Aug 2021 07:05)    Primary diagnosis of Chest pain       Today is hospital day 3d. This morning patient was seen and examined at bedside, resting comfortably in bed.      PAST MEDICAL & SURGICAL HISTORY  High cholesterol    GERD (gastroesophageal reflux disease)    H/O prostatectomy    History of left hip replacement      SOCIAL HISTORY:  Social History:      ALLERGIES:  No Known Allergies    MEDICATIONS:  STANDING MEDICATIONS  aspirin  chewable 81 milliGRAM(s) Oral daily  atorvastatin 40 milliGRAM(s) Oral at bedtime  enoxaparin Injectable 80 milliGRAM(s) SubCutaneous two times a day  metoprolol tartrate 12.5 milliGRAM(s) Oral two times a day  pantoprazole    Tablet 40 milliGRAM(s) Oral before breakfast    PRN MEDICATIONS        LABS:                        14.8   8.79  )-----------( 194      ( 24 Aug 2021 06:03 )             45.0     08-24    140  |  103  |  15  ----------------------------<  111<H>  4.1   |  24  |  1.1    Ca    9.6      24 Aug 2021 06:03  Phos  3.5     08-23  Mg     1.9     08-24    TPro  6.2  /  Alb  3.8  /  TBili  0.7  /  DBili  x   /  AST  50<H>  /  ALT  16  /  AlkPhos  71  08-23    PT/INR - ( 24 Aug 2021 06:03 )   PT: 12.00 sec;   INR: 1.04 ratio         PTT - ( 24 Aug 2021 06:03 )  PTT:33.0 sec      CARDIAC MARKERS ( 23 Aug 2021 06:02 )  x     / 1.60 ng/mL / x     / x     / x      CARDIAC MARKERS ( 22 Aug 2021 15:23 )  x     / 1.00 ng/mL / x     / x     / x          RADIOLOGY:  < from: Xray Chest 1 View- PORTABLE-Urgent (08.21.21 @ 09:43) >  Impression:    No radiographic evidence of acute cardiopulmonary disease.      < end of copied text >      < from: TTE Echo Complete w/o Contrast w/ Doppler (08.23.21 @ 08:22) >  Summary:   1. Left ventricular ejection fraction, by visual estimation, is 55 to 60%.   2. Mild left ventricular hypertrophy.   3. Normal left atrial size.   4. Trace mitral valve regurgitation.   5. There is mild aortic root calcification.    < end of copied text >      VITALS:   T(F): 98.8  HR: 68  BP: 117/75  RR: 21  SpO2: 97%    PHYSICAL EXAM:  GEN: No acute distress  LUNGS: Clear to auscultation bilaterally   HEART: S1/S2 present. RRR.   ABD: Soft, non-tender, non-distended. Bowel sounds present  EXT: NC/NC/NE/2+PP/RODRIGUEZ  NEURO: AAOX3, CN intact, motor and sensory intact  SKIN: intact            Assessment and Plan:   · Assessment	  76 yr old male with hx of HLD and GERD admitted for chest pain.    # NSTEMI  - Pain free since admission  - EKG: NSR with incomplete right BBB  - trop 0.12 --> 0.26 --> 0.65 -->1---> 1.6  - c/w ASA, metoprolol, statin and therapeutic Lovenox   -  ECHO : EF 55%, no wall motion abnormalities  - cardiology following : plan for Cath today    # HLD  - c/w statin     # DVT ppx: therapeutic Lovenox   # DASH Diet  # Ambulate as tolerated  # Full Code

## 2021-08-24 NOTE — PROGRESS NOTE ADULT - SUBJECTIVE AND OBJECTIVE BOX
Patient is a 76y old  Male who presents with a chief complaint of Chest pain (23 Aug 2021 14:42)      T(F): 98 (08-23-21 @ 23:00), Max: 98.2 (08-23-21 @ 15:01)  HR: 75 (08-24-21 @ 05:52)  BP: 123/72 (08-24-21 @ 05:52)  RR: 21 (08-24-21 @ 05:52)  SpO2: 97% (08-23-21 @ 15:01) (97% - 100%)    PHYSICAL EXAM:  GENERAL: NAD, well-groomed, well-developed  HEAD:  Atraumatic, Normocephalic  EYES: EOMI, PERRLA, conjunctiva and sclera clear  ENMT: No tonsillar erythema, exudates, or enlargement; Moist mucous membranes, Good dentition, No lesions  NECK: Supple, No JVD, Normal thyroid  NERVOUS SYSTEM:  Alert & Oriented X3,  Motor Strength 5/5 B/L upper and lower extremities  CHEST/LUNG: Clear to percussion bilaterally; No rales, rhonchi, wheezing, or rubs  HEART: Regular rate and rhythm; No murmurs, rubs, or gallops  ABDOMEN: Soft, Nontender, Nondistended; Bowel sounds present  EXTREMITIES:   No clubbing, cyanosis, or edema  LYMPH: No lymphadenopathy noted  SKIN: No rashes or lesions    labs  08-23    141  |  105  |  12  ----------------------------<  98  4.2   |  27  |  0.9    Ca    9.2      23 Aug 2021 06:02  Phos  3.5     08-23  Mg     1.8     08-23    TPro  6.2  /  Alb  3.8  /  TBili  0.7  /  DBili  x   /  AST  50<H>  /  ALT  16  /  AlkPhos  71  08-23                          13.7   8.29  )-----------( 192      ( 23 Aug 2021 06:02 )             41.9       PT/INR - ( 23 Aug 2021 06:02 )   PT: 12.30 sec;   INR: 1.07 ratio         PTT - ( 23 Aug 2021 06:02 )  PTT:34.3 sec        aspirin  chewable 81 milliGRAM(s) Oral daily  atorvastatin 40 milliGRAM(s) Oral at bedtime  enoxaparin Injectable 80 milliGRAM(s) SubCutaneous two times a day  metoprolol tartrate 12.5 milliGRAM(s) Oral two times a day  pantoprazole    Tablet 40 milliGRAM(s) Oral before breakfast

## 2021-08-24 NOTE — PROGRESS NOTE ADULT - SUBJECTIVE AND OBJECTIVE BOX
SUBJECTIVE:    Patient is a 76y old Male who presents with a chief complaint of Chest pain (24 Aug 2021 12:51)    Currently admitted to medicine with the primary diagnosis of Chest pain     Today is hospital day 3d. This morning he is resting in bed and reports no new issues or overnight events.     PAST MEDICAL & SURGICAL HISTORY  High cholesterol    GERD (gastroesophageal reflux disease)    H/O prostatectomy    History of left hip replacement      SOCIAL HISTORY:  Negative for smoking/alcohol/drug use.     ALLERGIES:  No Known Allergies    MEDICATIONS:  STANDING MEDICATIONS  aspirin  chewable 81 milliGRAM(s) Oral daily  atorvastatin 40 milliGRAM(s) Oral at bedtime  enoxaparin Injectable 80 milliGRAM(s) SubCutaneous two times a day  metoprolol tartrate 12.5 milliGRAM(s) Oral two times a day  pantoprazole    Tablet 40 milliGRAM(s) Oral before breakfast    PRN MEDICATIONS    VITALS:   T(F): 98.8  HR: 68  BP: 117/75  RR: 21  SpO2: 97%    LABS:                        14.8   8.79  )-----------( 194      ( 24 Aug 2021 06:03 )             45.0     08-24    140  |  103  |  15  ----------------------------<  111<H>  4.1   |  24  |  1.1    Ca    9.6      24 Aug 2021 06:03  Phos  3.5     08-23  Mg     1.9     08-24    TPro  6.2  /  Alb  3.8  /  TBili  0.7  /  DBili  x   /  AST  50<H>  /  ALT  16  /  AlkPhos  71  08-23    PT/INR - ( 24 Aug 2021 06:03 )   PT: 12.00 sec;   INR: 1.04 ratio         PTT - ( 24 Aug 2021 06:03 )  PTT:33.0 sec      CARDIAC MARKERS ( 23 Aug 2021 06:02 )  x     / 1.60 ng/mL / x     / x     / x      CARDIAC MARKERS ( 22 Aug 2021 15:23 )  x     / 1.00 ng/mL / x     / x     / x          RADIOLOGY:  TTE Echo Complete w/o Contrast w/ Doppler (08.23.21):   1. Left ventricular ejection fraction, by visual estimation, is 55 to 60%.   2. Mild left ventricular hypertrophy.   3. Normal left atrial size.    PHYSICAL EXAM:  GEN: No acute distress  LUNGS: Clear to auscultation bilaterally   HEART: S1/S2 present. RRR.   ABD: Soft, non-tender, non-distended. Bowel sounds present  EXT: NC/NC/NE/2+PP/RODRIGUEZ  NEURO: AAOX3, CN intact, motor and sensory intact   SKIN: intact

## 2021-08-24 NOTE — CONSULT NOTE ADULT - SUBJECTIVE AND OBJECTIVE BOX
CARDIOLOGY CONSULT NOTE     CHIEF COMPLAINT/REASON FOR CONSULT:    HPI:  76 yr old m w/a  pmh significant for HLD and GERD who presents with chest pain. He endorses he woke up around 4:30 Am with left arm pain. Also endorses associated burning like chest pain, midsternal location radiating to neck. Pt also reports taking ED medication yesterday. Pt denies any sob, nausea, vomiting, fevers, chills or any other complaints.   He does mention he was mowing the lawn yesterday afternoon. (21 Aug 2021 10:56)      PAST MEDICAL & SURGICAL HISTORY:  High cholesterol    GERD (gastroesophageal reflux disease)    H/O prostatectomy    History of left hip replacement        Cardiac Risks:   [ ]HTN, [ ] DM, [ ] Smoking, [ ] FH,  [x ] Lipids        MEDICATIONS:  MEDICATIONS  (STANDING):  aspirin  chewable 81 milliGRAM(s) Oral daily  atorvastatin 10 milliGRAM(s) Oral at bedtime  enoxaparin Injectable 80 milliGRAM(s) SubCutaneous two times a day  metoprolol tartrate 12.5 milliGRAM(s) Oral two times a day  pantoprazole    Tablet 40 milliGRAM(s) Oral before breakfast      FAMILY HISTORY:      SOCIAL HISTORY:      [ ] Marital status     Allergies    No Known Allergies        	    REVIEW OF SYSTEMS:  CONSTITUTIONAL: No fever, weight loss, or fatigue  EYES: No eye pain, visual disturbances, or discharge  ENMT:  No difficulty hearing, tinnitus, vertigo; No sinus or throat pain  NECK: No pain or stiffness  RESPIRATORY: No cough, wheezing, chills or hemoptysis; No Shortness of Breath  CARDIOVASCULAR: See above  GASTROINTESTINAL: No abdominal or epigastric pain. No nausea, vomiting, or hematemesis; No diarrhea or constipation. No melena or hematochezia.  GENITOURINARY: No dysuria, frequency, hematuria, or incontinence  NEUROLOGICAL: No headaches, memory loss, loss of strength, numbness, or tremors  SKIN: No itching, burning, rashes, or lesions   	        PHYSICAL EXAM:  T(C): 36.5 (08-22-21 @ 05:10), Max: 37.1 (08-21-21 @ 23:00)  HR: 59 (08-22-21 @ 05:10) (59 - 80)  BP: 115/63 (08-22-21 @ 05:10) (115/63 - 154/74)  RR: 16 (08-22-21 @ 05:10) (16 - 19)  SpO2: 100% (08-22-21 @ 05:10) (98% - 100%)  Wt(kg): --  I&O's Summary    21 Aug 2021 07:01  -  22 Aug 2021 06:37  --------------------------------------------------------  IN: 0 mL / OUT: 700 mL / NET: -700 mL        Appearance: Normal	  Psychiatry: A & O x 3, Mood & affect appropriate  HEENT:   Normal oral mucosa, PERRL, EOMI	  Lymphatic: No lymphadenopathy  Cardiovascular: Normal S1 S2,RRR, No JVD, No murmurs  Respiratory: Lungs clear to auscultation	  Gastrointestinal:  Soft, Non-tender, + BS	  Skin: No rashes, No ecchymoses, No cyanosis	  Neurologic: Non-focal  Extremities: Normal range of motion, No clubbing, cyanosis or edema  Vascular: Peripheral pulses palpable 2+ bilaterally      ECG:  < from: 12 Lead ECG (08.21.21 @ 08:32) >  Diagnosis Line Normal sinus rhythm  Incomplete right bundle branch block      Confirmed by ROHIT STEVE MD ([id number]) on 8/21/2021 10:29:13 AM    < end of copied text >  	    	  LABS:	 	    CARDIAC MARKERS:          Serum Pro-Brain Natriuretic Peptide: 66 pg/mL (08-21 @ 08:51)                            14.7   6.99  )-----------( 201      ( 21 Aug 2021 08:51 )             44.8     08-21    141  |  104  |  16  ----------------------------<  132<H>  4.1   |  26  |  1.2    Ca    9.7      21 Aug 2021 08:51  Mg     1.9     08-21    TPro  6.9  /  Alb  4.5  /  TBili  0.2  /  DBili  x   /  AST  16  /  ALT  11  /  AlkPhos  75  08-21      proBNP: Serum Pro-Brain Natriuretic Peptide: 66 pg/mL (08-21 @ 08:51)              
Date of Admission:    Evaluated by Dr. Chad Ware MD contact no. [phone]    CHIEF COMPLAINT:  Chest pain / Shortness of breath / Palpitations.    HISTORY OF PRESENT ILLNESS:     76 yr old m w/a  pmh significant for HLD and GERD who presented to ED 2 days ago with chest pain. He woke up around 4:30 Am with left arm pain, associated burning like chest pain, midsternal location radiating to neck. Pt also reports taking ED medication on day before admission. Pt denies any sob, nausea, vomiting, fevers, chills or any other complaints.   He does mention he was mowing the lawn  afternoon prior to admission, work up revealed inferior MI by q waves on EKG and rising Troponin levels.  No further chest pain.   PAST MEDICAL & SURGICAL HISTORY:  High cholesterol    GERD (gastroesophageal reflux disease)  H/O prostatectomy  History of left hip replacement    Allergies:    No Known Allergies.    	  Home Medications:  aspirin 81 mg oral tablet:  (21 Aug 2021 08:49)  atorvastatin:  (21 Aug 2021 08:48)  dicyclomine 20 mg oral tablet:  (21 Aug 2021 08:49)  famotidine:  (21 Aug 2021 08:48)  pantoprazole 40 mg oral delayed release tablet: 1 tab(s) orally once a day (21 Aug 2021 08:48)  sildenafil 20 mg oral tablet: 1 tab(s) orally 3 times a day (21 Aug 2021 08:50)    MEDICATIONS  (STANDING):  atorvastatin 40 milliGRAM(s) Oral at bedtime  metoprolol tartrate 12.5 milliGRAM(s) Oral two times a day  pantoprazole    Tablet 40 milliGRAM(s) Oral before breakfast    SOCIAL HISTORY:    [x ] Non-smoker  [ ] Alcohol    REVIEW OF SYSTEMS:  CONSTITUTIONAL: No fever, chills  CARDIOLOGY: Patient denies chest pain, shortness of breath, palpitations or syncopal episodes.   RESPIRATORY: denies shortness of breath, wheezing.   NEUROLOGICAL: Generalized weakness, no focal deficits to report.  ENDOCRINOLOGICAL: no recent change in diabetic medications.   GI: no melena/hematochezia, no Nausea, Vomiting, diarrhea.    PSYCHIATRY: normal mood and affect  HEENT: no epistaxis, headaches.  SKIN: no ecchymosis, no lesions.  MUSCULOSKELETAL: Full range of motion, pain in shoulder, spine.      PHYSICAL EXAM:  T(C): 37.1 (08-24-21 @ 07:10), Max: 37.1 (08-24-21 @ 07:10)  HR: 68 (08-24-21 @ 07:16) (68 - 78)  BP: 117/75 (08-24-21 @ 07:16) (117/75 - 131/62)  RR: 21 (08-24-21 @ 07:16) (21 - 30)  SpO2: --  Wt(kg): --  I&O's Summary    23 Aug 2021 07:01  -  24 Aug 2021 07:00  --------------------------------------------------------  IN: 930 mL / OUT: 0 mL / NET: 930 mL      General Appearance: Well developed, male in no distress	  Cardiovascular:  S1 S2 normal intensity, No JVD, No murmurs or gallops.  No peripheral edema  Respiratory: Lungs clear to auscultation	  Psychiatry: A & O x 3, Mood & affect appropriate  Gastrointestinal:  Soft, Non-tender, no bruits.  Skin: No rashes, No ecchymoses, No cyanosis	  Neurologic: No focal motor deficits.  Extremities: No clubbing, cyanosis or edema  Vascular: Peripheral pulses palpable 2+ bilaterally    LABS:	 	                          14.8   8.79  )-----------( 194      ( 24 Aug 2021 06:03 )             45.0     08-24    140  |  103  |  15  ----------------------------<  111<H>  4.1   |  24  |  1.1    Ca    9.6      24 Aug 2021 06:03  Phos  3.5     08-23  Mg     1.9     08-24    TPro  6.2  /  Alb  3.8  /  TBili  0.7  /  DBili  x   /  AST  50<H>  /  ALT  16  /  AlkPhos  71  08-23    CARDIAC MARKERS ( 23 Aug 2021 06:02 )  x     / 1.60 ng/mL / x     / x     / x          PT/INR - ( 24 Aug 2021 06:03 )   PT: 12.00 sec;   INR: 1.04 ratio         PTT - ( 24 Aug 2021 06:03 )  PTT:33.0 sec        ECG:  	NSR, RBBB, inferior wall MI    PREVIOUS DIAGNOSTIC TESTING:    Echo: normal LV systolic function, LVH, no sig valve lesions.  [x ] Stress Test:  	normal in Dec 2020.  	  ASSESSMENT/PLAN: 	    
Patient is a 76y old  Male who presents with a chief complaint of Chest pain (21 Aug 2021 10:56)      HPI:  76 yr old m w/a  pmh significant for HLD and GERD who presents with chest pain. He endorses he woke up around 4:30 Am with left arm pain. Also endorses associated burning like chest pain, midsternal location radiating to neck. Pt also reports taking ED medication yesterday. Pt denies any sob, nausea, vomiting, fevers, chills or any other complaints.   He does mention he was mowing the lawn yesterday afternoon. (21 Aug 2021 10:56). Patient has no pain now but did mention left arm discomfort when the pain started at home before he came to ER. He claims that he had stress test last year and it was normal.        PAST MEDICAL & SURGICAL HISTORY:  High cholesterol    GERD (gastroesophageal reflux disease)    H/O prostatectomy    History of left hip replacement          MEDICATIONS  (STANDING):  aspirin  chewable 81 milliGRAM(s) Oral daily  atorvastatin 10 milliGRAM(s) Oral at bedtime  enoxaparin Injectable 80 milliGRAM(s) SubCutaneous two times a day  metoprolol tartrate 12.5 milliGRAM(s) Oral two times a day  pantoprazole    Tablet 40 milliGRAM(s) Oral before breakfast    Allergies    No Known Allergies    Intolerances      Vital Signs Last 24 Hrs  T(C): 36.1 (21 Aug 2021 21:00), Max: 36.6 (21 Aug 2021 11:25)  T(F): 97 (21 Aug 2021 21:00), Max: 97.8 (21 Aug 2021 11:25)  HR: 60 (21 Aug 2021 21:00) (59 - 80)  BP: 135/66 (21 Aug 2021 21:00) (127/58 - 146/76)  BP(mean): --  RR: 16 (21 Aug 2021 21:00) (16 - 19)  SpO2: 98% (21 Aug 2021 11:25) (98% - 99%)  PHYSICAL EXAM:      Constitutional: fair.    Eyes: no icterus    ENMT: normal    Neck: no JVD. No LN enlargement      Back: no tenderness or rash    Respiratory: clear lungs    Cardiovascular: regular. no rub or murmur    Gastrointestinal: no tenderness or distension    Genitourinary: no complaints    Rectal: deferred    Extremities: no leg edema    Vascular: no varicose veins    Neurological: normal.    Skin: no rash    Lymph Nodes: not enlarged    Musculoskeletal: no tenderness    Psychiatric:      08-21    141  |  104  |  16  ----------------------------<  132<H>  4.1   |  26  |  1.2    Ca    9.7      21 Aug 2021 08:51  Mg     1.9     08-21    TPro  6.9  /  Alb  4.5  /  TBili  0.2  /  DBili  x   /  AST  16  /  ALT  11  /  AlkPhos  75  08-21      CBC Full  -  ( 21 Aug 2021 08:51 )  WBC Count : 6.99 K/uL  RBC Count : 4.80 M/uL  Hemoglobin : 14.7 g/dL  Hematocrit : 44.8 %  Platelet Count - Automated : 201 K/uL  Mean Cell Volume : 93.3 fL  Mean Cell Hemoglobin : 30.6 pg  Mean Cell Hemoglobin Concentration : 32.8 g/dL  Auto Neutrophil # : 4.74 K/uL  Auto Lymphocyte # : 1.26 K/uL  Auto Monocyte # : 0.70 K/uL  Auto Eosinophil # : 0.22 K/uL  Auto Basophil # : 0.04 K/uL  Auto Neutrophil % : 67.9 %  Auto Lymphocyte % : 18.0 %  Auto Monocyte % : 10.0 %  Auto Eosinophil % : 3.1 %  Auto Basophil % : 0.6 %    Troponin-0.24      EKG NSR , RBBB, old.    Radiology:

## 2021-08-24 NOTE — CHART NOTE - NSCHARTNOTEFT_GEN_A_CORE
Preliminary Cardiac Cath Report:    Pre- procedure diagnosis: NSTEMI  Post procedure diagnosis: severe 1 vessel disease     PROCEDURE:     Left Heart Catheterization     LV Angiogram     Selective Coronary Angiogram    Primary Physician:  Dr. Ware    Cardiac Fellow:  Dr Argueta & Dr Zambrano    ANESTHESIA TYPE: Sedation/Local    ESTIMATED BLOOD LOSS:  Less than 10 cc    Access: right radial artery     CONDITION: Good    FINDINGS OF PROCEDURE:  Left Heart Catheterization: LVEDP: 14 mmHg  LV Angiogram: Visual estimation of LVEF is 55%  Aortic valve: no gradient    Coronary Angiogram  The coronary circulation is right dominant.    LEFT MAIN: mild     LAD:         pLAD: mild disease         mLAD: moderate disease         dLAD: mild disease         Diag1: small vessel with LI        Diag2: moderate caliber vessel with LI    LCX:        pLCx: mild disease        OM1: small caliber vessel with no disease        OM2: medium caliber vessel with mild disease        OM3: small-medium caliber vessel with no disease        dLCx; small caliber vessel with LI    RCA:       pRCA: 75% stenosis       mRCA: mild disease       dRCA: 95% stenosis       rPDA: Normal      rPLA: Normal    Intervention   pRCA: 3.0 X 24 Synergy XD drug-eluting stent   dRCA: 2.5 X 16 Synergy XD drug-eluting stent     Sheath removed  Hemostasis was achieved with D stat   Transferred to hospitals area in hemodynamically stable condition and admitted to Telemetry floor     Complications:  None    Impression  Severe 1 vessel disease s/p PCI x2 (AUC 8)    Plan  Admit to the Telemetry CT Surgery consulted for surgical revascularization    Continue medical therapy  c/w DAPT,. statin and beta blocker. Preliminary Cardiac Cath Report:    Pre- procedure diagnosis: NSTEMI  Post procedure diagnosis: severe 1 vessel disease     PROCEDURE:     Left Heart Catheterization     LV Angiogram     Selective Coronary Angiogram    Primary Physician:  Dr. Ware    Cardiac Fellow:  Dr Argueta & Dr Zambrano    ANESTHESIA TYPE: Sedation/Local    ESTIMATED BLOOD LOSS:  Less than 10 cc    Access: Right radial artery     CONDITION: Good    FINDINGS OF PROCEDURE:  Left Heart Catheterization: LVEDP: 14 mmHg  LV Angiogram: Visual estimation of LVEF is 55%  Aortic valve: no gradient    Coronary Angiogram  The coronary circulation is right dominant.    LEFT MAIN: mild     LAD:         pLAD: mild disease         mLAD: moderate disease         dLAD: mild disease         Diag1: moderate to large with ostial 70% lesion.        Diag2: moderate caliber vessel with LI    LCX:        pLCx: moderate disease        OM1: small caliber vessel with no disease        OM2: medium caliber vessel with moderate disease        OM3: small-medium caliber vessel with no disease        dLCx; small caliber vessel with LI    RCA:       pRCA: 75% stenosis       mRCA: mild disease       dRCA: 95% discrete stenosis       rPDA: Normal      rPLA: Normal    Intervention   pRCA: PTCA with 2.5/12 NC balloon at [address], 3.0 X 24 Synergy XD drug-eluting stent at [address], dilated with 3.25.12 NC at [address].  75-->0%  dRCA: PTCA and 2.5 X 16 Synergy XD drug-eluting stent at [address], dilated with 2.5/8 NC balloon at [address].    Radial Sheath removed  Hemostasis was achieved with D stat   Transferred to [facility] area in hemodynamically stable condition and admitted to Telemetry floor     Complications:  None    Impression:  NSTEMI  Severe 1 vessel disease s/p PCI x2 (AUC 8)    Plan  Admit to the Telemetry   Continue medical therapy with ASA, Brilinta, MEtoprolol and Atorvastatin.

## 2021-08-25 ENCOUNTER — TRANSCRIPTION ENCOUNTER (OUTPATIENT)
Age: 76
End: 2021-08-25

## 2021-08-25 LAB
ALBUMIN SERPL ELPH-MCNC: 3.9 G/DL — SIGNIFICANT CHANGE UP (ref 3.5–5.2)
ALP SERPL-CCNC: 78 U/L — SIGNIFICANT CHANGE UP (ref 30–115)
ALT FLD-CCNC: 33 U/L — SIGNIFICANT CHANGE UP (ref 0–41)
ANION GAP SERPL CALC-SCNC: 11 MMOL/L — SIGNIFICANT CHANGE UP (ref 7–14)
AST SERPL-CCNC: 41 U/L — SIGNIFICANT CHANGE UP (ref 0–41)
BASOPHILS # BLD AUTO: 0.02 K/UL — SIGNIFICANT CHANGE UP (ref 0–0.2)
BASOPHILS NFR BLD AUTO: 0.2 % — SIGNIFICANT CHANGE UP (ref 0–1)
BILIRUB SERPL-MCNC: 0.9 MG/DL — SIGNIFICANT CHANGE UP (ref 0.2–1.2)
BUN SERPL-MCNC: 15 MG/DL — SIGNIFICANT CHANGE UP (ref 10–20)
CALCIUM SERPL-MCNC: 9.1 MG/DL — SIGNIFICANT CHANGE UP (ref 8.5–10.1)
CHLORIDE SERPL-SCNC: 106 MMOL/L — SIGNIFICANT CHANGE UP (ref 98–110)
CO2 SERPL-SCNC: 24 MMOL/L — SIGNIFICANT CHANGE UP (ref 17–32)
CREAT SERPL-MCNC: 1 MG/DL — SIGNIFICANT CHANGE UP (ref 0.7–1.5)
EOSINOPHIL # BLD AUTO: 0.15 K/UL — SIGNIFICANT CHANGE UP (ref 0–0.7)
EOSINOPHIL NFR BLD AUTO: 1.7 % — SIGNIFICANT CHANGE UP (ref 0–8)
GLUCOSE SERPL-MCNC: 107 MG/DL — HIGH (ref 70–99)
HCT VFR BLD CALC: 41.9 % — LOW (ref 42–52)
HGB BLD-MCNC: 14 G/DL — SIGNIFICANT CHANGE UP (ref 14–18)
IMM GRANULOCYTES NFR BLD AUTO: 0.3 % — SIGNIFICANT CHANGE UP (ref 0.1–0.3)
LYMPHOCYTES # BLD AUTO: 1.09 K/UL — LOW (ref 1.2–3.4)
LYMPHOCYTES # BLD AUTO: 12.5 % — LOW (ref 20.5–51.1)
MCHC RBC-ENTMCNC: 30.2 PG — SIGNIFICANT CHANGE UP (ref 27–31)
MCHC RBC-ENTMCNC: 33.4 G/DL — SIGNIFICANT CHANGE UP (ref 32–37)
MCV RBC AUTO: 90.3 FL — SIGNIFICANT CHANGE UP (ref 80–94)
MONOCYTES # BLD AUTO: 1.08 K/UL — HIGH (ref 0.1–0.6)
MONOCYTES NFR BLD AUTO: 12.4 % — HIGH (ref 1.7–9.3)
NEUTROPHILS # BLD AUTO: 6.32 K/UL — SIGNIFICANT CHANGE UP (ref 1.4–6.5)
NEUTROPHILS NFR BLD AUTO: 72.9 % — SIGNIFICANT CHANGE UP (ref 42.2–75.2)
NRBC # BLD: 0 /100 WBCS — SIGNIFICANT CHANGE UP (ref 0–0)
PLATELET # BLD AUTO: 177 K/UL — SIGNIFICANT CHANGE UP (ref 130–400)
POTASSIUM SERPL-MCNC: 4 MMOL/L — SIGNIFICANT CHANGE UP (ref 3.5–5)
POTASSIUM SERPL-SCNC: 4 MMOL/L — SIGNIFICANT CHANGE UP (ref 3.5–5)
PROT SERPL-MCNC: 6.5 G/DL — SIGNIFICANT CHANGE UP (ref 6–8)
RBC # BLD: 4.64 M/UL — LOW (ref 4.7–6.1)
RBC # FLD: 12.6 % — SIGNIFICANT CHANGE UP (ref 11.5–14.5)
SODIUM SERPL-SCNC: 141 MMOL/L — SIGNIFICANT CHANGE UP (ref 135–146)
WBC # BLD: 8.69 K/UL — SIGNIFICANT CHANGE UP (ref 4.8–10.8)
WBC # FLD AUTO: 8.69 K/UL — SIGNIFICANT CHANGE UP (ref 4.8–10.8)

## 2021-08-25 PROCEDURE — 99232 SBSQ HOSP IP/OBS MODERATE 35: CPT

## 2021-08-25 PROCEDURE — 93010 ELECTROCARDIOGRAM REPORT: CPT

## 2021-08-25 RX ORDER — LISINOPRIL 2.5 MG/1
2.5 TABLET ORAL DAILY
Refills: 0 | Status: DISCONTINUED | OUTPATIENT
Start: 2021-08-25 | End: 2021-08-25

## 2021-08-25 RX ORDER — METOPROLOL TARTRATE 50 MG
25 TABLET ORAL
Refills: 0 | Status: DISCONTINUED | OUTPATIENT
Start: 2021-08-25 | End: 2021-08-25

## 2021-08-25 RX ORDER — METOPROLOL TARTRATE 50 MG
25 TABLET ORAL EVERY 8 HOURS
Refills: 0 | Status: DISCONTINUED | OUTPATIENT
Start: 2021-08-25 | End: 2021-08-26

## 2021-08-25 RX ADMIN — Medication 650 MILLIGRAM(S): at 00:13

## 2021-08-25 RX ADMIN — Medication 25 MILLIGRAM(S): at 15:05

## 2021-08-25 RX ADMIN — TICAGRELOR 90 MILLIGRAM(S): 90 TABLET ORAL at 05:21

## 2021-08-25 RX ADMIN — ATORVASTATIN CALCIUM 40 MILLIGRAM(S): 80 TABLET, FILM COATED ORAL at 21:21

## 2021-08-25 RX ADMIN — LIDOCAINE 1 PATCH: 4 CREAM TOPICAL at 09:09

## 2021-08-25 RX ADMIN — PANTOPRAZOLE SODIUM 40 MILLIGRAM(S): 20 TABLET, DELAYED RELEASE ORAL at 05:21

## 2021-08-25 RX ADMIN — Medication 25 MILLIGRAM(S): at 21:21

## 2021-08-25 RX ADMIN — Medication 12.5 MILLIGRAM(S): at 05:21

## 2021-08-25 RX ADMIN — Medication 81 MILLIGRAM(S): at 12:15

## 2021-08-25 RX ADMIN — TICAGRELOR 90 MILLIGRAM(S): 90 TABLET ORAL at 17:04

## 2021-08-25 RX ADMIN — LIDOCAINE 1 PATCH: 4 CREAM TOPICAL at 08:25

## 2021-08-25 NOTE — DISCHARGE NOTE PROVIDER - NSDCFUADDINST_GEN_ALL_CORE_FT
Please take your medications as prescribed and follow up with Dr. Ware in 2 weeks and your PCP in 1 month.

## 2021-08-25 NOTE — DISCHARGE NOTE PROVIDER - CARE PROVIDERS DIRECT ADDRESSES
[email or web address]

## 2021-08-25 NOTE — PROGRESS NOTE ADULT - SUBJECTIVE AND OBJECTIVE BOX
FLOWER AGUSTIN 76y Male  MRN#: [id number]   CODE STATUS: Full    Hospital Day: 4d    Pt is currently admitted with the primary diagnosis of NSTEMI s/p cath    SUBJECTIVE  Hospital Course  Pt seen and examined at bedside. No CP or SOB. s/p cath with 2 stents placed in LAD and RCA. Pt feels good today, slightly tachycardic ~90-100s, and SBP in the 100s. Increased metoprolol to 25 BID and will monitor for one day then discharge tomorrow.    Overnight events   None    Subjective complaints   None    Present Today:   - Toney:  No [x], Yes [   ] : Indication:     - Type of IV Access:       .. CVC/Piccline:  No [  ], Yes [   ] : Indication:       .. Midline: No [  ], Yes [   ] : Indication:                                             ----------------------------------------------------------  OBJECTIVE  PAST MEDICAL & SURGICAL HISTORY  High cholesterol    GERD (gastroesophageal reflux disease)    H/O prostatectomy    History of left hip replacement                                              -----------------------------------------------------------  ALLERGIES:  No Known Allergies                                            ------------------------------------------------------------    HOME MEDICATIONS  Home Medications:  aspirin 81 mg oral tablet:  (21 Aug 2021 08:49)  atorvastatin:  (21 Aug 2021 08:48)  dicyclomine 20 mg oral tablet:  (21 Aug 2021 08:49)  famotidine:  (21 Aug 2021 08:48)  pantoprazole 40 mg oral delayed release tablet: 1 tab(s) orally once a day (21 Aug 2021 08:48)  sildenafil 20 mg oral tablet: 1 tab(s) orally 3 times a day (21 Aug 2021 08:50)                           MEDICATIONS:  STANDING MEDICATIONS  aspirin  chewable 81 milliGRAM(s) Oral daily  atorvastatin 40 milliGRAM(s) Oral at bedtime  metoprolol tartrate 25 milliGRAM(s) Oral two times a day  pantoprazole    Tablet 40 milliGRAM(s) Oral before breakfast  ticagrelor 90 milliGRAM(s) Oral every 12 hours    PRN MEDICATIONS  acetaminophen   Tablet .. 650 milliGRAM(s) Oral every 6 hours PRN                                            ------------------------------------------------------------  VITAL SIGNS: Last 24 Hours  T(C): 36.8 (25 Aug 2021 05:14), Max: 36.8 (24 Aug 2021 16:20)  T(F): 98.3 (25 Aug 2021 05:14), Max: 98.3 (25 Aug 2021 05:14)  HR: 68 (25 Aug 2021 05:14) (68 - 86)  BP: 113/61 (25 Aug 2021 05:14) (102/59 - 116/69)  BP(mean): --  RR: 18 (25 Aug 2021 05:14) (18 - 18)  SpO2: --      08-24-21 @ 07:01  -  08-25-21 @ 07:00  --------------------------------------------------------  IN: 580 mL / OUT: 700 mL / NET: -120 mL                                             --------------------------------------------------------------  LABS:                        14.0   8.69  )-----------( 177      ( 25 Aug 2021 05:24 )             41.9     08-25    141  |  106  |  15  ----------------------------<  107<H>  4.0   |  24  |  1.0    Ca    9.1      25 Aug 2021 05:24  Mg     1.9     08-24    TPro  6.5  /  Alb  3.9  /  TBili  0.9  /  DBili  x   /  AST  41  /  ALT  33  /  AlkPhos  78  08-25    PT/INR - ( 24 Aug 2021 06:03 )   PT: 12.00 sec;   INR: 1.04 ratio         PTT - ( 24 Aug 2021 06:03 )  PTT:33.0 sec                                              -------------------------------------------------------------  RADIOLOGY:    Coronary Angiogram  The coronary circulation is right dominant.    LEFT MAIN: mild     LAD:         pLAD: mild disease         mLAD: moderate disease         dLAD: mild disease         Diag1: moderate to large with ostial 70% lesion.        Diag2: moderate caliber vessel with LI    LCX:        pLCx: moderate disease        OM1: small caliber vessel with no disease        OM2: medium caliber vessel with moderate disease        OM3: small-medium caliber vessel with no disease        dLCx; small caliber vessel with LI    RCA:       pRCA: 75% stenosis       mRCA: mild disease       dRCA: 95% discrete stenosis       rPDA: Normal      rPLA: Normal    --------------------------------------------------------------------------------------------------------------------------------------------    EXAM:  XR CHEST PORTABLE URGENT 1V          PROCEDURE DATE:  08/21/2021      INTERPRETATION:  Clinical History / Reason for exam: Pain    Comparison : Chest radiograph January 2, 2013.    Technique/Positioning: Frontal/low lung volumes.    Impression:    No radiographic evidence of acute cardiopulmonary disease.                                            --------------------------------------------------------------    PHYSICAL EXAM:  General: NAD, AOx3  HEENT: Normocephalic, atraumatic  LUNGS: Normal breath sounds, no wheezes or crackles  HEART: RRR. no murmurs, rubs, or gallops  ABDOMEN: Soft, NT/ND. no rigidity or guarding  EXT: peripheral pulses +2, no cyanosis/edema  NEURO: grossly normal motor exam  SKIN: no rashes or bruises                                           --------------------------------------------------------------

## 2021-08-25 NOTE — PROGRESS NOTE ADULT - SUBJECTIVE AND OBJECTIVE BOX
Cardiology Follow up    FLOWER AGUSTIN   76y Male  PAST MEDICAL & SURGICAL HISTORY:  High cholesterol    GERD (gastroesophageal reflux disease)    H/O prostatectomy    History of left hip replacement         HPI:  76 yr old m w/a  pmh significant for HLD and GERD who presents with chest pain. He endorses he woke up around 4:30 Am with left arm pain. Also endorses associated burning like chest pain, midsternal location radiating to neck. Pt also reports taking ED medication yesterday. Pt denies any sob, nausea, vomiting, fevers, chills or any other complaints.   He does mention he was mowing the lawn yesterday afternoon. (21 Aug 2021 10:56)    Allergies    No Known Allergies    Intolerances    Patient seen and examined at bedside. No acute events overnight.  Patient without complaints. Pt ambulated without issues/symptoms  Denies CP, SOB, palpitations, or dizziness  ST noted on telemetry overnight    Vital Signs Last 24 Hrs  T(C): 36.8 (25 Aug 2021 05:14), Max: 36.8 (24 Aug 2021 16:20)  T(F): 98.3 (25 Aug 2021 05:14), Max: 98.3 (25 Aug 2021 05:14)  HR: 68 (25 Aug 2021 05:14) (68 - 86)  BP: 113/61 (25 Aug 2021 05:14) (102/59 - 116/69)  BP(mean): --  RR: 18 (25 Aug 2021 05:14) (18 - 18)  SpO2: --    MEDICATIONS  (STANDING):  aspirin  chewable 81 milliGRAM(s) Oral daily  atorvastatin 40 milliGRAM(s) Oral at bedtime  metoprolol tartrate 25 milliGRAM(s) Oral two times a day  pantoprazole    Tablet 40 milliGRAM(s) Oral before breakfast  ticagrelor 90 milliGRAM(s) Oral every 12 hours    MEDICATIONS  (PRN):  acetaminophen   Tablet .. 650 milliGRAM(s) Oral every 6 hours PRN Mild Pain (1 - 3)    REVIEW OF SYSTEMS:          All negative except as mentioned in HPI    PHYSICAL EXAM:           CONSTITUTIONAL: Well-developed; well-nourished; in no acute distress  	SKIN: warm, dry  	HEAD: Normocephalic; atraumatic  	EYES: PERRL.  	ENT: No nasal discharge, airway clear, mucous membranes moist  	NECK: Supple; non tender.  	CARD: +S1, +S2, no murmurs, gallops, or rubs. Regular rate and rhythm    	RESP: No wheezes, rales or rhonchi. CTA B/L  	ABD: soft ntnd, + BS x 4 quadrants  	EXT: moves all extremities,  no clubbing, cyanosis or edema  	NEURO: Alert and oriented x3, no focal deficits          PSYCH: Cooperative, appropriate          VASCULAR:  + Rad / + PTs / +  DPs          EXTREMITY:              Right Radial: pressure dressing removed, access site soft, no hematoma, no pain, + pulses, no sign of infection, no numbness            ECG:   < from: 12 Lead ECG (08.24.21 @ 16:22) >  Ventricular Rate 85 BPM    Atrial Rate 85 BPM    P-R Interval 128 ms    QRS Duration 122 ms    Q-T Interval 430 ms    QTC Calculation(Bazett) 511 ms    P Axis 36 degrees    R Axis 10 degrees    T Axis 13 degrees    Diagnosis Line Normal sinus rhythm  Right bundle branch block  Possible Lateral infarct , age undetermined  Inferior infarct , age undetermined  Abnormal ECG    Confirmed by AR QUINTERO MD ([id number]) on 8/24/2021 6:52:03 PM                                                                                        2D ECHO:  < from: TTE Echo Complete w/o Contrast w/ Doppler (08.23.21 @ 08:22) >  Summary:   1. Left ventricular ejection fraction, by visual estimation, is 55 to 60%.   2. Mild left ventricular hypertrophy.   3. Normal left atrial size.   4. Trace mitral valve regurgitation.   5. There is mild aortic root calcification.    LABS:                        14.0   8.69  )-----------( 177      ( 25 Aug 2021 05:24 )             41.9     08-25    141  |  106  |  15  ----------------------------<  107<H>  4.0   |  24  |  1.0    Ca    9.1      25 Aug 2021 05:24  Mg     1.9     08-24    TPro  6.5  /  Alb  3.9  /  TBili  0.9  /  DBili  x   /  AST  41  /  ALT  33  /  AlkPhos  78  08-25        LIVER FUNCTIONS - ( 25 Aug 2021 05:24 )  Alb: 3.9 g/dL / Pro: 6.5 g/dL / ALK PHOS: 78 U/L / ALT: 33 U/L / AST: 41 U/L / GGT: x             A/P:  I discussed the case with Cardiologist Dr. Ware and recommend the following:    S/P PCI:                        OOB to chair, ambulate                   Keep K = 4, Mg = 2                   D/C lisinopril, No Acei/Arb due to soft B/P, will reevaluate tomorrow                    Increase Metoprolol to 25 mg PO q12 hr  	     Continue DAPT ( Aspirin 81 mg PO Daily and Brilinta 90 mg PO q12 hr ), B-Blocker, Statin Therapy                   Patient pharmacy called in for Brilinta prescription and it is 19.50$ per month                   Patient agreeing to take DAPT for at least one year or as directed by cardiologist                    Pt given instructions on importance of taking antiplatelet medication or risk acute stent thrombosis/death                   Post cath instructions, access site care and activity restrictions reviewed with patient                     Discussed with patient to return to hospital if experience chest pain, shortness breath, dizziness and site bleeding                   Aggressive risk factor modification, diet counseling, smoking cessation discussed with patient                       Cardiac rehab information provided/ referral and communication to cardiac rehab provided                   Monitor on Tele                                  Cardiology Follow up    FLOWER AGUSTIN   76y Male  PAST MEDICAL & SURGICAL HISTORY:  High cholesterol    GERD (gastroesophageal reflux disease)    H/O prostatectomy    History of left hip replacement         HPI:  76 yr old m w/a  pmh significant for HLD and GERD who presents with chest pain. He endorses he woke up around 4:30 Am with left arm pain. Also endorses associated burning like chest pain, midsternal location radiating to neck. Pt also reports taking ED medication yesterday. Pt denies any sob, nausea, vomiting, fevers, chills or any other complaints.   He does mention he was mowing the lawn yesterday afternoon. (21 Aug 2021 10:56)    Allergies    No Known Allergies    Intolerances    Patient seen and examined at bedside. No acute events overnight.  Patient without complaints. Pt ambulated without issues/symptoms  Denies CP, SOB, palpitations, or dizziness  ST noted on telemetry overnight    Vital Signs Last 24 Hrs  T(C): 36.8 (25 Aug 2021 05:14), Max: 36.8 (24 Aug 2021 16:20)  T(F): 98.3 (25 Aug 2021 05:14), Max: 98.3 (25 Aug 2021 05:14)  HR: 68 (25 Aug 2021 05:14) (68 - 86)  BP: 113/61 (25 Aug 2021 05:14) (102/59 - 116/69)  BP(mean): --  RR: 18 (25 Aug 2021 05:14) (18 - 18)  SpO2: --    MEDICATIONS  (STANDING):  aspirin  chewable 81 milliGRAM(s) Oral daily  atorvastatin 40 milliGRAM(s) Oral at bedtime  metoprolol tartrate 25 milliGRAM(s) Oral two times a day  pantoprazole    Tablet 40 milliGRAM(s) Oral before breakfast  ticagrelor 90 milliGRAM(s) Oral every 12 hours    MEDICATIONS  (PRN):  acetaminophen   Tablet .. 650 milliGRAM(s) Oral every 6 hours PRN Mild Pain (1 - 3)    REVIEW OF SYSTEMS:          All negative except as mentioned in HPI    PHYSICAL EXAM:           CONSTITUTIONAL: Well-developed; well-nourished; in no acute distress  	SKIN: warm, dry  	HEAD: Normocephalic; atraumatic  	EYES: PERRL.  	ENT: No nasal discharge, airway clear, mucous membranes moist  	NECK: Supple; non tender.  	CARD: +S1, +S2, no murmurs, gallops, or rubs. Regular rate and rhythm    	RESP: No wheezes, rales or rhonchi. CTA B/L  	ABD: soft ntnd, + BS x 4 quadrants  	EXT: moves all extremities,  no clubbing, cyanosis or edema  	NEURO: Alert and oriented x3, no focal deficits          PSYCH: Cooperative, appropriate          VASCULAR:  + Rad / + PTs / +  DPs          EXTREMITY:              Right Radial: pressure dressing removed, access site soft, no hematoma, no pain, + pulses, no sign of infection, no numbness            ECG:   < from: 12 Lead ECG (08.24.21 @ 16:22) >  Ventricular Rate 85 BPM    Atrial Rate 85 BPM    P-R Interval 128 ms    QRS Duration 122 ms    Q-T Interval 430 ms    QTC Calculation(Bazett) 511 ms    P Axis 36 degrees    R Axis 10 degrees    T Axis 13 degrees    Diagnosis Line Normal sinus rhythm  Right bundle branch block  Possible Lateral infarct , age undetermined  Inferior infarct , age undetermined  Abnormal ECG    Confirmed by AR QUINTERO MD ([id number]) on 8/24/2021 6:52:03 PM                                                                                        2D ECHO:  < from: TTE Echo Complete w/o Contrast w/ Doppler (08.23.21 @ 08:22) >  Summary:   1. Left ventricular ejection fraction, by visual estimation, is 55 to 60%.   2. Mild left ventricular hypertrophy.   3. Normal left atrial size.   4. Trace mitral valve regurgitation.   5. There is mild aortic root calcification.    LABS:                        14.0   8.69  )-----------( 177      ( 25 Aug 2021 05:24 )             41.9     08-25    141  |  106  |  15  ----------------------------<  107<H>  4.0   |  24  |  1.0    Ca    9.1      25 Aug 2021 05:24  Mg     1.9     08-24    TPro  6.5  /  Alb  3.9  /  TBili  0.9  /  DBili  x   /  AST  41  /  ALT  33  /  AlkPhos  78  08-25        LIVER FUNCTIONS - ( 25 Aug 2021 05:24 )  Alb: 3.9 g/dL / Pro: 6.5 g/dL / ALK PHOS: 78 U/L / ALT: 33 U/L / AST: 41 U/L / GGT: x             A/P:  I discussed the case with Cardiologist Dr. Ware and recommend the following:    S/P PCI:   Impression:  NSTEMI  Severe 1 vessel disease s/p PCI x2 (AUC 8)                       OOB to chair, ambulate                   Keep K = 4, Mg = 2                   D/C lisinopril, No Acei/Arb due to soft B/P, will reevaluate tomorrow                    Increase Metoprolol to 25 mg PO q12 hr                      	     Continue DAPT ( Aspirin 81 mg PO Daily and Brilinta 90 mg PO q12 hr ), B-Blocker, Statin Therapy                   Patient pharmacy called in for Brilinta prescription and it is 19.50$ per month                   Patient agreeing to take DAPT for at least one year or as directed by cardiologist                    Pt given instructions on importance of taking antiplatelet medication or risk acute stent thrombosis/death                   Post cath instructions, access site care and activity restrictions reviewed with patient                     Discussed with patient to return to hospital if experience chest pain, shortness breath, dizziness and site bleeding                   Aggressive risk factor modification, diet counseling, smoking cessation discussed with patient                       Cardiac rehab information provided/ referral and communication to cardiac rehab provided                   Monitor on Tele overnight, anticipate D/C home tomorrow.                                   Cardiology Follow up    FLOWER AGUSTIN   76y Male is s/p NSTEMI and stent of proximal and distal RCA yesterday.  PAST MEDICAL & SURGICAL HISTORY:  High cholesterol    GERD (gastroesophageal reflux disease)    H/O prostatectomy    History of left hip replacement      Allergies    No Known Allergies      Patient seen and examined at bedside. No acute events overnight.  Patient without complaints. Pt ambulated without issues/symptoms  Denies CP, SOB, palpitations, or dizziness  ST noted on telemetry overnight    Vital Signs Last 24 Hrs  T(C): 36.8 (25 Aug 2021 05:14), Max: 36.8 (24 Aug 2021 16:20)  T(F): 98.3 (25 Aug 2021 05:14), Max: 98.3 (25 Aug 2021 05:14)  HR: 68 (25 Aug 2021 05:14) (68 - 86)  BP: 113/61 (25 Aug 2021 05:14) (102/59 - 116/69)  BP(mean): --  RR: 18 (25 Aug 2021 05:14) (18 - 18)  SpO2: --    MEDICATIONS  (STANDING):  aspirin  chewable 81 milliGRAM(s) Oral daily  atorvastatin 40 milliGRAM(s) Oral at bedtime  metoprolol tartrate 25 milliGRAM(s) Oral two times a day  pantoprazole    Tablet 40 milliGRAM(s) Oral before breakfast  ticagrelor 90 milliGRAM(s) Oral every 12 hours    MEDICATIONS  (PRN):  acetaminophen   Tablet .. 650 milliGRAM(s) Oral every 6 hours PRN Mild Pain (1 - 3)    REVIEW OF SYSTEMS:          All negative except as mentioned in HPI    PHYSICAL EXAM:           CONSTITUTIONAL: Well-developed; well-nourished; in no acute distress  	SKIN: warm, dry  	HEAD: Normocephalic; atraumatic  	EYES: PERRL.  	ENT: No nasal discharge, airway clear, mucous membranes moist  	NECK: Supple; non tender.  	CARD: +S1, +S2, no murmurs, gallops, or rubs. Regular rate and rhythm    	RESP: No wheezes, rales or rhonchi. CTA B/L  	ABD: soft ntnd, + BS x 4 quadrants  	EXT: moves all extremities,  no clubbing, cyanosis or edema  	NEURO: Alert and oriented x3, no focal deficits          PSYCH: Cooperative, appropriate          VASCULAR:  + Rad / + PTs / +  DPs          EXTREMITY:              Right Radial: pressure dressing removed, access site soft, no hematoma, no pain, + pulses, no sign of infection, no numbness            ECG:   < from: 12 Lead ECG (08.24.21 @ 16:22) >  Ventricular Rate 85 BPM    Atrial Rate 85 BPM    P-R Interval 128 ms    QRS Duration 122 ms    Q-T Interval 430 ms    QTC Calculation(Bazett) 511 ms    P Axis 36 degrees    R Axis 10 degrees    T Axis 13 degrees    Diagnosis Line Normal sinus rhythm  Right bundle branch block  Possible Lateral infarct , age undetermined  Inferior infarct , age undetermined  Abnormal ECG    Confirmed by AR QUINTERO MD ([id number]) on 8/24/2021 6:52:03 PM                                                                                        2D ECHO:  < from: TTE Echo Complete w/o Contrast w/ Doppler (08.23.21 @ 08:22) >  Summary:   1. Left ventricular ejection fraction, by visual estimation, is 55 to 60%.   2. Mild left ventricular hypertrophy.   3. Normal left atrial size.   4. Trace mitral valve regurgitation.   5. There is mild aortic root calcification.    LABS:                        14.0   8.69  )-----------( 177      ( 25 Aug 2021 05:24 )             41.9     08-25    141  |  106  |  15  ----------------------------<  107<H>  4.0   |  24  |  1.0    Ca    9.1      25 Aug 2021 05:24  Mg     1.9     08-24    TPro  6.5  /  Alb  3.9  /  TBili  0.9  /  DBili  x   /  AST  41  /  ALT  33  /  AlkPhos  78  08-25        LIVER FUNCTIONS - ( 25 Aug 2021 05:24 )  Alb: 3.9 g/dL / Pro: 6.5 g/dL / ALK PHOS: 78 U/L / ALT: 33 U/L / AST: 41 U/L / GGT: x             A/P:  I discussed the case with Cardiologist Dr. Ware and recommend the following:    S/P PCI:   Impression:  NSTEMI  Severe 1 vessel disease s/p PCI x2 (AUC 8)                       OOB to chair, ambulate                   Keep K = 4, Mg = 2                   D/C lisinopril, No Acei/Arb due to soft B/P, will reevaluate tomorrow                    Increase Metoprolol to 50 mg PO q12 hr                      	     Continue DAPT ( Aspirin 81 mg PO Daily and Brilinta 90 mg PO q12 hr ), B-Blocker, Statin Therapy                   Patient pharmacy called in for Brilinta prescription and it is 19.50$ per month                   Patient agreeing to take DAPT for at least one year or as directed by cardiologist                    Pt given instructions on importance of taking antiplatelet medication or risk acute stent thrombosis/death                   Post cath instructions, access site care and activity restrictions reviewed with patient                     Discussed with patient to return to hospital if experience chest pain, shortness breath, dizziness and site bleeding                   Aggressive risk factor modification, diet counseling, smoking cessation discussed with patient                       Cardiac rehab information provided/ referral and communication to cardiac rehab provided                   Monitor on Tele overnight, anticipate D/C home tomorrow.

## 2021-08-25 NOTE — DISCHARGE NOTE NURSING/CASE MANAGEMENT/SOCIAL WORK - NSDCPEFALRISK_GEN_ALL_CORE
For information on Fall & injury Prevention, visit [email or web address]

## 2021-08-25 NOTE — PROGRESS NOTE ADULT - SUBJECTIVE AND OBJECTIVE BOX
FLOWER AGUSTIN  76y  [facility] T6-3C 014 B      Patient is a 76y old  Male who presents with a chief complaint of Chest pain (25 Aug 2021 10:49)      INTERVAL HPI/OVERNIGHT EVENTS:  no acute events overnight      REVIEW OF SYSTEMS:  CONSTITUTIONAL: No fever, weight loss, or fatigue  EYES: No eye pain, visual disturbances, or discharge  ENMT:  No difficulty hearing, tinnitus, vertigo; No sinus or throat pain  NECK: No pain or stiffness  BREASTS: No pain, masses, or nipple discharge  RESPIRATORY: No cough, wheezing, chills or hemoptysis; No shortness of breath  CARDIOVASCULAR: No chest pain, palpitations, dizziness, or leg swelling  GASTROINTESTINAL: No abdominal or epigastric pain. No nausea, vomiting, or hematemesis; No diarrhea or constipation. No melena or hematochezia.  GENITOURINARY: No dysuria, frequency, hematuria, or incontinence  NEUROLOGICAL: No headaches, memory loss, loss of strength, numbness, or tremors  SKIN: No itching, burning, rashes, or lesions   LYMPH NODES: No enlarged glands  ENDOCRINE: No heat or cold intolerance; No hair loss  MUSCULOSKELETAL: No joint pain or swelling; No muscle, back, or extremity pain  PSYCHIATRIC: No depression, anxiety, mood swings, or difficulty sleeping  HEME/LYMPH: No easy bruising, or bleeding gums  ALLERY AND IMMUNOLOGIC: No hives or eczema  FAMILY HISTORY:    T(C): 36.7 (08-25-21 @ 12:05), Max: 36.8 (08-24-21 @ 16:20)  HR: 104 (08-25-21 @ 12:05) (68 - 104)  BP: 132/65 (08-25-21 @ 12:05) (102/59 - 132/65)  RR: 18 (08-25-21 @ 12:05) (18 - 18)  SpO2: --  Wt(kg): --Vital Signs Last 24 Hrs  T(C): 36.7 (25 Aug 2021 12:05), Max: 36.8 (24 Aug 2021 16:20)  T(F): 98 (25 Aug 2021 12:05), Max: 98.3 (25 Aug 2021 05:14)  HR: 104 (25 Aug 2021 12:05) (68 - 104)  BP: 132/65 (25 Aug 2021 12:05) (102/59 - 132/65)  BP(mean): --  RR: 18 (25 Aug 2021 12:05) (18 - 18)  SpO2: --    PHYSICAL EXAM:  GENERAL: NAD, well-groomed, well-developed  HEAD:  Atraumatic, Normocephalic  EYES: EOMI, PERRLA, conjunctiva and sclera clear  ENMT: No tonsillar erythema, exudates, or enlargement; Moist mucous membranes, Good dentition, No lesions  NECK: Supple, No JVD, Normal thyroid  NERVOUS SYSTEM:  Alert & Oriented X3, Good concentration; Motor Strength 5/5 B/L upper and lower extremities; DTRs 2+ intact and symmetric  PULM: Clear to auscultation bilaterally  CARDIAC: Regular rate and rhythm; No murmurs, rubs, or gallops  GI: Soft, Nontender, Nondistended; Bowel sounds present  EXTREMITIES:  2+ Peripheral Pulses, No clubbing, cyanosis, or edema  LYMPH: No lymphadenopathy noted  SKIN: No rashes or lesions    Consultant(s) Notes Reviewed:  [x ] YES  [ ] NO  Care Discussed with Consultants/Other Providers [ x] YES  [ ] NO    LABS:                            14.0   8.69  )-----------( 177      ( 25 Aug 2021 05:24 )             41.9   08-25    141  |  106  |  15  ----------------------------<  107<H>  4.0   |  24  |  1.0    Ca    9.1      25 Aug 2021 05:24  Mg     1.9     08-24    TPro  6.5  /  Alb  3.9  /  TBili  0.9  /  DBili  x   /  AST  41  /  ALT  33  /  AlkPhos  78  08-25            acetaminophen   Tablet .. 650 milliGRAM(s) Oral every 6 hours PRN  aspirin  chewable 81 milliGRAM(s) Oral daily  atorvastatin 40 milliGRAM(s) Oral at bedtime  metoprolol tartrate 25 milliGRAM(s) Oral two times a day  pantoprazole    Tablet 40 milliGRAM(s) Oral before breakfast  ticagrelor 90 milliGRAM(s) Oral every 12 hours      HEALTH ISSUES - PROBLEM Dx:          Case Discussed with House Staff   Spectra [phone]

## 2021-08-25 NOTE — DISCHARGE NOTE PROVIDER - PROVIDER TOKENS
PROVIDER:[TOKEN:[[id number]],FOLLOWUP:[2 weeks]],PROVIDER:[TOKEN:[[id number]],FOLLOWUP:[1 month]]

## 2021-08-25 NOTE — DISCHARGE NOTE PROVIDER - NSDCCPCAREPLAN_GEN_ALL_CORE_FT
PRINCIPAL DISCHARGE DIAGNOSIS  Diagnosis: NSTEMI (non-ST elevation myocardial infarction)  Assessment and Plan of Treatment: You have had a heart attack (acute myocardial infarction). A heart attack occurs when a vessel that sends blood to your heart suddenly becomes blocked. This causes your heart not to work as well as it should. You presented with chest pain and was found to have elevated cardiac enzymes (troponins) with a normal EKG. The combination of that and your symptoms indicate that you had a Non- ST elevation MI; a heart attack without changes on the EKG. You underwent a cath that showed severe stenosis of the RCA and a 70% stenosis of the LAD, both of which had stents put in to keep them open. You were also started on aspirin 81mg daily and brilinta 90mg twice a day. It is very important for you to keep taking those medications as prescribed to avoid complications from the stents.   Please follow up with Dr. Ware in 2 weeks.  Take your medications as prescribed and follow up with your PCP in 1 month.         PRINCIPAL DISCHARGE DIAGNOSIS  Diagnosis: NSTEMI (non-ST elevation myocardial infarction)  Assessment and Plan of Treatment: You have had a heart attack (acute myocardial infarction). A heart attack occurs when a vessel that sends blood to your heart suddenly becomes blocked. This causes your heart not to work as well as it should. You presented with chest pain and was found to have elevated cardiac enzymes (troponins) with a normal EKG. The combination of that and your symptoms indicate that you had a Non- ST elevation MI; a heart attack without changes on the EKG. You underwent a cath that showed severe stenosis of the RCA and a 70% stenosis of the LAD, both of which had stents put in to keep them open. On discharge, continue taking metoprolol 25mg twice daily. You were also started on aspirin 81mg daily and brilinta 90mg twice a day. It is very important for you to keep taking those medications as prescribed to avoid complications from the stents.   Please follow up with Dr. Ware in 2 weeks.  Take your medications as prescribed and follow up with your PCP in 1 month.         PRINCIPAL DISCHARGE DIAGNOSIS  Diagnosis: NSTEMI (non-ST elevation myocardial infarction)  Assessment and Plan of Treatment: You have had a heart attack (acute myocardial infarction). A heart attack occurs when a vessel that sends blood to your heart suddenly becomes blocked. This causes your heart not to work as well as it should. You presented with chest pain and was found to have elevated cardiac enzymes (troponins) with a normal EKG. The combination of that and your symptoms indicate that you had a Non- ST elevation MI; a heart attack without changes on the EKG. You underwent a cath that showed severe stenosis of the RCA and a 70% stenosis of the LAD, both of which had stents put in to keep them open. On discharge, continue taking metoprolol succinate 50mg twice daily. You were also started on aspirin 81mg daily and brilinta 90mg twice a day. It is very important for you to keep taking those medications as prescribed to avoid complications from the stents.   On discharge:  ---> Continue taking metoprolol succinate 50mg twice daily.  ---> Start taking lisinopril 2.5mg once daily at bedtime.  ---> Follow up with Dr. Ware in 2 weeks.

## 2021-08-25 NOTE — PROGRESS NOTE ADULT - ASSESSMENT
HPI:  76 yr old m w/a  pmh significant for HLD and GERD who presents with chest pain. He endorses he woke up around 4:30 Am with left arm pain. Also endorses associated burning like chest pain, midsternal location radiating to neck. Pt also reports taking ED medication yesterday. Pt denies any sob, nausea, vomiting, fevers, chills or any other complaints.   He does mention he was mowing the lawn yesterday afternoon. (21 Aug 2021 10:56)    #Midsternal chest pain secondary to chest angina secondary to NSTEMI secondary single vessel CAD  sp catherization   appreciate cardiology follow up   cw medical therapy     #Trace mitral valve regurgitation    Overweight BMI 29 patient needs to see dieitian outpatient for further evaluation     #Dyslipidemia on statin     #GERD     PROGRESS NOTE HANDOFF    Pending:  dc in am     Family discussion: patient verbalized understanding and agreeable to plan of care , wife also at bedside understood plan for dc in am     Disposition: Home

## 2021-08-25 NOTE — DISCHARGE NOTE NURSING/CASE MANAGEMENT/SOCIAL WORK - PATIENT PORTAL LINK FT
You can access the FollowMyHealth Patient Portal offered by [facility] [place] by registering at the following website: [email or web address]. By joining [employer]’s FollowMyHealth portal, you will also be able to view your health information using other applications (apps) compatible with our system.

## 2021-08-25 NOTE — PROGRESS NOTE ADULT - ASSESSMENT
Chart reviewed, pt examined. Discussed with Cardio NP.  Pt has resting HR of 90s and sinus tachycardia on minimal exertion.  Will increase Metoprolol to 50 mg q12H and discharge tomorrow if stable.

## 2021-08-25 NOTE — DISCHARGE NOTE PROVIDER - CARE PROVIDER_API CALL
Chad Ware  CARDIOLOGY  [address]  [place] [address]  Phone: [phone]  Fax: [phone]  Follow Up Time: 2 weeks    Selvin Powers (DO)  Medicine  Physicians  [address] [place] [address]  Phone: [phone]  Fax: [phone]  Follow Up Time: 1 month

## 2021-08-25 NOTE — DISCHARGE NOTE PROVIDER - NSDCMRMEDTOKEN_GEN_ALL_CORE_FT
aspirin 81 mg oral tablet:   atorvastatin:   Brilinta (ticagrelor) 90 mg oral tablet: 1 tab(s) orally 2 times a day MDD:2  dicyclomine 20 mg oral tablet:   famotidine:   pantoprazole 40 mg oral delayed release tablet: 1 tab(s) orally once a day  sildenafil 20 mg oral tablet: 1 tab(s) orally 3 times a day   aspirin 81 mg oral tablet:   atorvastatin 40 mg oral tablet: 1 tab(s) orally once a day (at bedtime)  Brilinta (ticagrelor) 90 mg oral tablet: 1 tab(s) orally 2 times a day MDD:2  dicyclomine 20 mg oral tablet:   famotidine:   lisinopril 2.5 mg oral tablet: 1 tab(s) orally once a day (at bedtime)   metoprolol succinate 50 mg oral tablet, extended release: 1 tab(s) orally 2 times a day   pantoprazole 40 mg oral delayed release tablet: 1 tab(s) orally once a day  sildenafil 20 mg oral tablet: 1 tab(s) orally 3 times a day

## 2021-08-25 NOTE — DISCHARGE NOTE PROVIDER - HOSPITAL COURSE
76 year old M PMHx significant for HLD and GERD who presents with chest pain. He endorses he woke up around 4:30 Am with left arm pain. Also endorses associated burning like chest pain, midsternal location radiating to neck. Pt also reports taking ED medication yesterday. Pt denies any sob, nausea, vomiting, fevers, chills or any other complaints. He does mention he was mowing the lawn yesterday afternoon.    # NSTEMI  - stable   - EKG: NSR with incomplete right BBB  - trop 0.12 --> 0.26 --> 0.65 --> 1.0 --> 1.6  - c/w ASA, metoprolol, statin and therapeutic Lovenox   - TTE Echo Complete w/o Contrast w/ Doppler (08.23.21):Left ventricular ejection fraction, by visual estimation, is 55 to 60%. Mild left ventricular hypertrophy.  - cath today     # HLD  - c/w statin     # DVT ppx  - c/w therapeutic Lovenox     LAD: moderate to large with ostial 70% lesion.  RCA up to 95% stenosis    Impression:  NSTEMI  Severe 1 vessel disease s/p PCI x2 (AUC 8)    Plan  Admit to the Telemetry   Continue medical therapy with ASA, Brilinta, MEtoprolol and Atorvastatin.   76 year old M PMHx significant for HLD and GERD who presents with chest pain. He endorses he woke up around 4:30 Am with left arm pain. Also endorses associated burning like chest pain, midsternal location radiating to neck. Pt also reports taking ED medication yesterday. Pt denies any sob, nausea, vomiting, fevers, chills or any other complaints. He does mention he was mowing the lawn yesterday afternoon.    # NSTEMI  - stable over his stay  - EKG: NSR with incomplete right BBB  - trop 0.12 --> 0.26 --> 0.65 --> 1.0 --> 1.6 (8/23)  - Started on ASA, metoprolol, statin and therapeutic Lovenox, then switched to brilinta on discharge  - TTE Echo Complete w/o Contrast w/ Doppler (08.23.21): Left ventricular EF 55-60% (normal), and mild left ventricular hypertrophy.  - cath yesterday showed moderate to large ostial lesion (70%) in the LAD, and up to 95% stenosis in the RCA. stented for both.    # HLD  - On atorvastatin 40mg     On discharge:  Patient to follow up with his cardiologist Dr. Ware in 2 weeks.   Continue taking medications as prescribed (including brilinta 90mg twice a day) and follow up with your primary care doctor. 76 year old M PMHx significant for HLD and GERD who presents with chest pain. He endorses he woke up around 4:30 Am with left arm pain. Also endorses associated burning like chest pain, midsternal location radiating to neck. Pt also reports taking ED medication yesterday. Pt denies any sob, nausea, vomiting, fevers, chills or any other complaints. He does mention he was mowing the lawn yesterday afternoon.    # NSTEMI  - stable over his stay  - EKG: NSR with incomplete right BBB  - trop 0.12 --> 0.26 --> 0.65 --> 1.0 --> 1.6 (8/23)  - Started on ASA, metoprolol, statin and therapeutic Lovenox, then switched to brilinta on discharge  - TTE Echo Complete w/o Contrast w/ Doppler (08.23.21): Left ventricular EF 55-60% (normal), and mild left ventricular hypertrophy.  - cath yesterday showed moderate to large ostial lesion (70%) in the LAD, and up to 95% stenosis in the RCA. stented for both  - On discharge, continue taking metoprolol 25mg twice daily.    # HLD  - On atorvastatin 40mg     On discharge:  Patient to follow up with his cardiologist Dr. Ware in 2 weeks.   Continue taking medications as prescribed (including brilinta 90mg twice a day) and follow up with your primary care doctor. 76 year old M PMHx significant for HLD and GERD who presents with chest pain. He endorses he woke up around 4:30 Am with left arm pain. Also endorses associated burning like chest pain, midsternal location radiating to neck. Pt also reports taking ED medication yesterday. Pt denies any sob, nausea, vomiting, fevers, chills or any other complaints. He does mention he was mowing the lawn yesterday afternoon.    # NSTEMI  - stable over his stay  - EKG: NSR with incomplete right BBB  - trop 0.12 --> 0.26 --> 0.65 --> 1.0 --> 1.6 (8/23)  - Started on aspirin, metoprolol, statin and therapeutic Lovenox, then switched to brilinta on discharge  - TTE Echo Complete w/o Contrast w/ Doppler (08.23.21): Left ventricular EF 55-60% (normal), and mild left ventricular hypertrophy.  - cath on 8/24 showed moderate to large ostial lesion (70%) in the LAD, and up to 95% stenosis in the RCA. stented for both  On discharge:  ---> Continue taking metoprolol succinate 50mg twice daily.  ---> Start taking lisinopril 2.5mg once daily at bedtime.  ---> Follow up with Dr. Ware in 2 weeks.    # HLD  - On atorvastatin 40mg

## 2021-08-26 VITALS
SYSTOLIC BLOOD PRESSURE: 135 MMHG | DIASTOLIC BLOOD PRESSURE: 69 MMHG | WEIGHT: 173.28 LBS | HEART RATE: 76 BPM | TEMPERATURE: 98 F | RESPIRATION RATE: 18 BRPM

## 2021-08-26 LAB
ALBUMIN SERPL ELPH-MCNC: 3.9 G/DL — SIGNIFICANT CHANGE UP (ref 3.5–5.2)
ALP SERPL-CCNC: 81 U/L — SIGNIFICANT CHANGE UP (ref 30–115)
ALT FLD-CCNC: 35 U/L — SIGNIFICANT CHANGE UP (ref 0–41)
ANION GAP SERPL CALC-SCNC: 12 MMOL/L — SIGNIFICANT CHANGE UP (ref 7–14)
AST SERPL-CCNC: 31 U/L — SIGNIFICANT CHANGE UP (ref 0–41)
BASOPHILS # BLD AUTO: 0.03 K/UL — SIGNIFICANT CHANGE UP (ref 0–0.2)
BASOPHILS NFR BLD AUTO: 0.4 % — SIGNIFICANT CHANGE UP (ref 0–1)
BILIRUB SERPL-MCNC: 0.8 MG/DL — SIGNIFICANT CHANGE UP (ref 0.2–1.2)
BUN SERPL-MCNC: 19 MG/DL — SIGNIFICANT CHANGE UP (ref 10–20)
CALCIUM SERPL-MCNC: 9.2 MG/DL — SIGNIFICANT CHANGE UP (ref 8.5–10.1)
CHLORIDE SERPL-SCNC: 103 MMOL/L — SIGNIFICANT CHANGE UP (ref 98–110)
CO2 SERPL-SCNC: 25 MMOL/L — SIGNIFICANT CHANGE UP (ref 17–32)
CREAT SERPL-MCNC: 1.2 MG/DL — SIGNIFICANT CHANGE UP (ref 0.7–1.5)
EOSINOPHIL # BLD AUTO: 0.13 K/UL — SIGNIFICANT CHANGE UP (ref 0–0.7)
EOSINOPHIL NFR BLD AUTO: 1.6 % — SIGNIFICANT CHANGE UP (ref 0–8)
GLUCOSE SERPL-MCNC: 114 MG/DL — HIGH (ref 70–99)
HCT VFR BLD CALC: 41 % — LOW (ref 42–52)
HGB BLD-MCNC: 13.6 G/DL — LOW (ref 14–18)
IMM GRANULOCYTES NFR BLD AUTO: 0.4 % — HIGH (ref 0.1–0.3)
LYMPHOCYTES # BLD AUTO: 1 K/UL — LOW (ref 1.2–3.4)
LYMPHOCYTES # BLD AUTO: 12.7 % — LOW (ref 20.5–51.1)
MAGNESIUM SERPL-MCNC: 2 MG/DL — SIGNIFICANT CHANGE UP (ref 1.8–2.4)
MCHC RBC-ENTMCNC: 29.6 PG — SIGNIFICANT CHANGE UP (ref 27–31)
MCHC RBC-ENTMCNC: 33.2 G/DL — SIGNIFICANT CHANGE UP (ref 32–37)
MCV RBC AUTO: 89.3 FL — SIGNIFICANT CHANGE UP (ref 80–94)
MONOCYTES # BLD AUTO: 1.05 K/UL — HIGH (ref 0.1–0.6)
MONOCYTES NFR BLD AUTO: 13.3 % — HIGH (ref 1.7–9.3)
NEUTROPHILS # BLD AUTO: 5.65 K/UL — SIGNIFICANT CHANGE UP (ref 1.4–6.5)
NEUTROPHILS NFR BLD AUTO: 71.6 % — SIGNIFICANT CHANGE UP (ref 42.2–75.2)
NRBC # BLD: 0 /100 WBCS — SIGNIFICANT CHANGE UP (ref 0–0)
PLATELET # BLD AUTO: 216 K/UL — SIGNIFICANT CHANGE UP (ref 130–400)
POTASSIUM SERPL-MCNC: 4.6 MMOL/L — SIGNIFICANT CHANGE UP (ref 3.5–5)
POTASSIUM SERPL-SCNC: 4.6 MMOL/L — SIGNIFICANT CHANGE UP (ref 3.5–5)
PROT SERPL-MCNC: 6.6 G/DL — SIGNIFICANT CHANGE UP (ref 6–8)
RBC # BLD: 4.59 M/UL — LOW (ref 4.7–6.1)
RBC # FLD: 12.6 % — SIGNIFICANT CHANGE UP (ref 11.5–14.5)
SODIUM SERPL-SCNC: 140 MMOL/L — SIGNIFICANT CHANGE UP (ref 135–146)
WBC # BLD: 7.89 K/UL — SIGNIFICANT CHANGE UP (ref 4.8–10.8)
WBC # FLD AUTO: 7.89 K/UL — SIGNIFICANT CHANGE UP (ref 4.8–10.8)

## 2021-08-26 PROCEDURE — 99239 HOSP IP/OBS DSCHRG MGMT >30: CPT

## 2021-08-26 RX ORDER — METOPROLOL TARTRATE 50 MG
1 TABLET ORAL
Qty: 60 | Refills: 0
Start: 2021-08-26 | End: 2021-09-24

## 2021-08-26 RX ORDER — LISINOPRIL 2.5 MG/1
1 TABLET ORAL
Qty: 30 | Refills: 0
Start: 2021-08-26 | End: 2021-09-24

## 2021-08-26 RX ORDER — METOPROLOL TARTRATE 50 MG
50 TABLET ORAL
Refills: 0 | Status: DISCONTINUED | OUTPATIENT
Start: 2021-08-26 | End: 2021-08-26

## 2021-08-26 RX ORDER — ATORVASTATIN CALCIUM 80 MG/1
1 TABLET, FILM COATED ORAL
Qty: 0 | Refills: 0 | DISCHARGE

## 2021-08-26 RX ORDER — METOPROLOL TARTRATE 50 MG
25 TABLET ORAL ONCE
Refills: 0 | Status: COMPLETED | OUTPATIENT
Start: 2021-08-26 | End: 2021-08-26

## 2021-08-26 RX ORDER — ATORVASTATIN CALCIUM 80 MG/1
0 TABLET, FILM COATED ORAL
Qty: 0 | Refills: 0 | DISCHARGE

## 2021-08-26 RX ADMIN — PANTOPRAZOLE SODIUM 40 MILLIGRAM(S): 20 TABLET, DELAYED RELEASE ORAL at 05:25

## 2021-08-26 RX ADMIN — Medication 81 MILLIGRAM(S): at 11:21

## 2021-08-26 RX ADMIN — Medication 25 MILLIGRAM(S): at 05:25

## 2021-08-26 RX ADMIN — TICAGRELOR 90 MILLIGRAM(S): 90 TABLET ORAL at 05:25

## 2021-08-26 RX ADMIN — Medication 25 MILLIGRAM(S): at 09:51

## 2021-08-26 NOTE — PROGRESS NOTE ADULT - SUBJECTIVE AND OBJECTIVE BOX
JOLANTAFLOWER ZHAO  76y  [facility] T6-3C 014 B      Patient is a 76y old  Male who presents with a chief complaint of Chest pain (26 Aug 2021 10:36)      INTERVAL HPI/OVERNIGHT EVENTS:    no acute events overnight     REVIEW OF SYSTEMS:  CONSTITUTIONAL: No fever, weight loss, or fatigue  EYES: No eye pain, visual disturbances, or discharge  ENMT:  No difficulty hearing, tinnitus, vertigo; No sinus or throat pain  NECK: No pain or stiffness  BREASTS: No pain, masses, or nipple discharge  RESPIRATORY: No cough, wheezing, chills or hemoptysis; No shortness of breath  CARDIOVASCULAR: No chest pain, palpitations, dizziness, or leg swelling  GASTROINTESTINAL: No abdominal or epigastric pain. No nausea, vomiting, or hematemesis; No diarrhea or constipation. No melena or hematochezia.  GENITOURINARY: No dysuria, frequency, hematuria, or incontinence  NEUROLOGICAL: No headaches, memory loss, loss of strength, numbness, or tremors  SKIN: No itching, burning, rashes, or lesions   LYMPH NODES: No enlarged glands  ENDOCRINE: No heat or cold intolerance; No hair loss  MUSCULOSKELETAL: No joint pain or swelling; No muscle, back, or extremity pain  PSYCHIATRIC: No depression, anxiety, mood swings, or difficulty sleeping  HEME/LYMPH: No easy bruising, or bleeding gums  ALLERY AND IMMUNOLOGIC: No hives or eczema  FAMILY HISTORY:    T(C): 36.4 (08-26-21 @ 04:53), Max: 37.6 (08-25-21 @ 20:08)  HR: 76 (08-26-21 @ 04:53) (76 - 96)  BP: 135/69 (08-26-21 @ 04:53) (135/69 - 139/65)  RR: 18 (08-26-21 @ 04:53) (18 - 18)  SpO2: --  Wt(kg): --Vital Signs Last 24 Hrs  T(C): 36.4 (26 Aug 2021 04:53), Max: 37.6 (25 Aug 2021 20:08)  T(F): 97.5 (26 Aug 2021 04:53), Max: 99.7 (25 Aug 2021 20:08)  HR: 76 (26 Aug 2021 04:53) (76 - 96)  BP: 135/69 (26 Aug 2021 04:53) (135/69 - 139/65)  BP(mean): --  RR: 18 (26 Aug 2021 04:53) (18 - 18)  SpO2: --    PHYSICAL EXAM:  GENERAL: NAD, well-groomed, well-developed  HEAD:  Atraumatic, Normocephalic  EYES: EOMI, PERRLA, conjunctiva and sclera clear  ENMT: No tonsillar erythema, exudates, or enlargement; Moist mucous membranes, Good dentition, No lesions  NECK: Supple, No JVD, Normal thyroid  NERVOUS SYSTEM:  Alert & Oriented X3, Good concentration; Motor Strength 5/5 B/L upper and lower extremities; DTRs 2+ intact and symmetric  PULM: Clear to auscultation bilaterally  CARDIAC: Regular rate and rhythm; No murmurs, rubs, or gallops  GI: Soft, Nontender, Nondistended; Bowel sounds present  EXTREMITIES:  2+ Peripheral Pulses, No clubbing, cyanosis, or edema  LYMPH: No lymphadenopathy noted  SKIN: No rashes or lesions    Consultant(s) Notes Reviewed:  [x ] YES  [ ] NO  Care Discussed with Consultants/Other Providers [ x] YES  [ ] NO    LABS:                            13.6   7.89  )-----------( 216      ( 26 Aug 2021 05:21 )             41.0   08-26    140  |  103  |  19  ----------------------------<  114<H>  4.6   |  25  |  1.2    Ca    9.2      26 Aug 2021 05:21  Mg     2.0     08-26    TPro  6.6  /  Alb  3.9  /  TBili  0.8  /  DBili  x   /  AST  31  /  ALT  35  /  AlkPhos  81  08-26            acetaminophen   Tablet .. 650 milliGRAM(s) Oral every 6 hours PRN  aspirin  chewable 81 milliGRAM(s) Oral daily  atorvastatin 40 milliGRAM(s) Oral at bedtime  metoprolol tartrate 50 milliGRAM(s) Oral two times a day  pantoprazole    Tablet 40 milliGRAM(s) Oral before breakfast  ticagrelor 90 milliGRAM(s) Oral every 12 hours      HEALTH ISSUES - PROBLEM Dx:          Case Discussed with House Staff   42 minutes spent on total encounter; more than 50% of the visit was spent counseling and/or coordinating care by the attending physician inc.luding dc planning  Spectra [phone]

## 2021-08-26 NOTE — PROGRESS NOTE ADULT - PROVIDER SPECIALTY LIST ADULT
Critical Care
Internal Medicine
Internal Medicine
Intervent Cardiology
Intervent Cardiology
Cardiology
Cardiology
Critical Care
Hospitalist
Hospitalist
Internal Medicine

## 2021-08-26 NOTE — PROGRESS NOTE ADULT - ASSESSMENT
HPI:  76 yr old m w/a  pmh significant for HLD and GERD who presents with chest pain. He endorses he woke up around 4:30 Am with left arm pain. Also endorses associated burning like chest pain, midsternal location radiating to neck. Pt also reports taking ED medication yesterday. Pt denies any sob, nausea, vomiting, fevers, chills or any other complaints.   He does mention he was mowing the lawn yesterday afternoon. (21 Aug 2021 10:56)    #Midsternal chest pain secondary to chest angina secondary to NSTEMI secondary single vessel CAD  sp catherization   outpatient cardiology follow up   cw medication upon dc     #Trace mitral valve regurgitation    Overweight BMI 29 patient needs to see dieitian outpatient for further evaluation     #Dyslipidemia on statin     #GERD     PROGRESS NOTE HANDOFF    Pending:  dc    Family discussion: patient verbalized understanding and agreeable to plan of care , wife also at bedside both verbalized dc plan home today    Disposition: Home

## 2021-08-26 NOTE — PROGRESS NOTE ADULT - SUBJECTIVE AND OBJECTIVE BOX
FLOWER AGUSTIN 76y Male  MRN#: [id number]      Pt is currently admitted with the primary diagnosis of NSTEMI.    SUBJECTIVE  Hospital Day: 5d  Hospital Course: Pt seen and examined at bedside. No CP or SOB. s/p cath with 2 stents placed in LAD and RCA. Pt feels good today. Increased metoprolol to 25 TID and monitored HR and BP overnight. HR today is 60, /69. Pt to be d/c today.    Overnight event: No major overnight events. Pt laying in bed this AM.     Subjective complaints: No complaints and no symptoms.     Present Today:   - Toney:  No [  ], Yes [   ] : Indication:     - Type of IV Access:       .. CVC/Piccline:  No [  ], Yes [   ] : Indication:       .. Midline: No [  ], Yes [   ] : Indication:                                             ----------------------------------------------------------  OBJECTIVE        PAST MEDICAL & SURGICAL HISTORY  High cholesterol    GERD (gastroesophageal reflux disease)    H/O prostatectomy    History of left hip replacement                                              -----------------------------------------------------------  ALLERGIES:  No Known Allergies                                            ------------------------------------------------------------    HOME MEDICATIONS  Home Medications:  aspirin 81 mg oral tablet:  (21 Aug 2021 08:49)  atorvastatin:  (21 Aug 2021 08:48)  dicyclomine 20 mg oral tablet:  (21 Aug 2021 08:49)  famotidine:  (21 Aug 2021 08:48)  pantoprazole 40 mg oral delayed release tablet: 1 tab(s) orally once a day (21 Aug 2021 08:48)  sildenafil 20 mg oral tablet: 1 tab(s) orally 3 times a day (21 Aug 2021 08:50)                           MEDICATIONS:  STANDING MEDICATIONS  aspirin  chewable 81 milliGRAM(s) Oral daily  atorvastatin 40 milliGRAM(s) Oral at bedtime  metoprolol tartrate 50 milliGRAM(s) Oral two times a day  metoprolol tartrate 25 milliGRAM(s) Oral once  pantoprazole    Tablet 40 milliGRAM(s) Oral before breakfast  ticagrelor 90 milliGRAM(s) Oral every 12 hours    PRN MEDICATIONS  acetaminophen   Tablet .. 650 milliGRAM(s) Oral every 6 hours PRN                                            ------------------------------------------------------------  VITAL SIGNS: Last 24 Hours  T(C): 36.4 (26 Aug 2021 04:53), Max: 37.6 (25 Aug 2021 20:08)  T(F): 97.5 (26 Aug 2021 04:53), Max: 99.7 (25 Aug 2021 20:08)  HR: 76 (26 Aug 2021 04:53) (76 - 104)  BP: 135/69 (26 Aug 2021 04:53) (132/65 - 139/65)  BP(mean): --  RR: 18 (26 Aug 2021 04:53) (18 - 18)  SpO2: --                                             --------------------------------------------------------------  LABS:                        13.6   7.89  )-----------( 216      ( 26 Aug 2021 05:21 )             41.0     08-26    140  |  103  |  19  ----------------------------<  114<H>  4.6   |  25  |  1.2    Ca    9.2      26 Aug 2021 05:21  Mg     2.0     08-26    TPro  6.6  /  Alb  3.9  /  TBili  0.8  /  DBili  x   /  AST  31  /  ALT  35  /  AlkPhos  81  08-26                                                              -------------------------------------------------------------  RADIOLOGY:                                            --------------------------------------------------------------    PHYSICAL EXAM:  General: NAD, AOx3  HEENT: Normocephalic, atraumatic  LUNGS: Normal breath sounds, no wheezes or crackles  HEART: RRR. no murmurs, rubs, or gallops  ABDOMEN: Soft, NT/ND. no rigidity or guarding  EXT: peripheral pulses +2, no cyanosis/edema  NEURO: grossly normal motor exam  SKIN: no rashes or bruises

## 2021-08-26 NOTE — PROGRESS NOTE ADULT - ASSESSMENT
# NSTEMI  - stable   - EKG: NSR with incomplete right BBB  - trop 0.12 --> 0.26 --> 0.65 --> 1.0 --> 1.6 (8/23)  - c/w ASA, statin, Brilinta 90mg BID, and Metoprolol. Increased metoprolol to 25mg TID to better control HR; will monitor effect on BP.  - TTE Echo Complete w/o Contrast w/ Doppler (08.23.21):Left ventricular EF 55-60%. Mild left ventricular hypertrophy.  - Cath (8/24) showed 70% stenosis in LAD, and up to 95% stenosis in RCA. Stents placed for both. Currently on Brilinta and ASA.  - HR 60, /69, Pt to be d/c today.     # HLD  - C/w atorvastatin 40mg    #Handoff: Monitor BP and HR on the increased metoprolol dose of 25mg TID. Anticipated DC for today; pt stable otherwise.                                                                              ----------------------------------------------------  # DVT prophylaxis ASA + brilinta    # GI prophylaxis Protonix PO    # Diet Regular    # Activity Score (AM-PAC)    # Code status Full     # Disposition Home

## 2021-08-26 NOTE — PROGRESS NOTE ADULT - SUBJECTIVE AND OBJECTIVE BOX
Cardiology Follow up    FLOWER AGUSTIN   76y Male  PAST MEDICAL & SURGICAL HISTORY:  High cholesterol    GERD (gastroesophageal reflux disease)    H/O prostatectomy    History of left hip replacement         HPI:  76 yr old m w/a  pmh significant for HLD and GERD who presents with chest pain. He endorses he woke up around 4:30 Am with left arm pain. Also endorses associated burning like chest pain, midsternal location radiating to neck. Pt also reports taking ED medication yesterday. Pt denies any sob, nausea, vomiting, fevers, chills or any other complaints.   He does mention he was mowing the lawn yesterday afternoon. (21 Aug 2021 10:56)    Allergies    No Known Allergies    Intolerances      Patient seen and examined at bedside. No acute events overnight.  Patient without complaints. Pt ambulated without issues/symptoms  Denies CP, SOB, palpitations, or dizziness  No events on telemetry overnight    Vital Signs Last 24 Hrs  T(C): 36.4 (26 Aug 2021 04:53), Max: 37.6 (25 Aug 2021 20:08)  T(F): 97.5 (26 Aug 2021 04:53), Max: 99.7 (25 Aug 2021 20:08)  HR: 76 (26 Aug 2021 04:53) (76 - 104)  BP: 135/69 (26 Aug 2021 04:53) (132/65 - 139/65)  BP(mean): --  RR: 18 (26 Aug 2021 04:53) (18 - 18)  SpO2: --    MEDICATIONS  (STANDING):  aspirin  chewable 81 milliGRAM(s) Oral daily  atorvastatin 40 milliGRAM(s) Oral at bedtime  metoprolol tartrate 50 milliGRAM(s) Oral two times a day  pantoprazole    Tablet 40 milliGRAM(s) Oral before breakfast  ticagrelor 90 milliGRAM(s) Oral every 12 hours    MEDICATIONS  (PRN):  acetaminophen   Tablet .. 650 milliGRAM(s) Oral every 6 hours PRN Mild Pain (1 - 3)      REVIEW OF SYSTEMS:          All negative except as mentioned in HPI    PHYSICAL EXAM:           CONSTITUTIONAL: Well-developed; well-nourished; in no acute distress  	SKIN: warm, dry  	HEAD: Normocephalic; atraumatic  	EYES: PERRL.  	ENT: No nasal discharge, airway clear, mucous membranes moist  	NECK: Supple; non tender.  	CARD: +S1, +S2, no murmurs, gallops, or rubs. Regular rate and rhythm    	RESP: No wheezes, rales or rhonchi. CTA B/L  	ABD: soft ntnd, + BS x 4 quadrants  	EXT: moves all extremities,  no clubbing, cyanosis or edema  	NEURO: Alert and oriented x3, no focal deficits          PSYCH: Cooperative, appropriate          VASCULAR:  + Rad / + PTs / +  DPs          EXTREMITY:              Right Radial: pressure dressing removed, access site soft, no hematoma, no pain, + pulses, no sign of infection, no numbness            ECG:   < from: 12 Lead ECG (08.25.21 @ 08:41) >  Ventricular Rate 123 BPM    Atrial Rate 123 BPM    P-R Interval 126 ms    QRS Duration 128 ms    Q-T Interval 344 ms    QTC Calculation(Bazett) 492 ms    P Axis 31 degrees    R Axis -30 degrees    T Axis 10 degrees    Diagnosis Line Sinus tachycardia  Left axis deviation  Right bundle branch block  Inferior infarct , age undetermined  Abnormal ECG    Confirmed by Yo Mejias ([id number]) on 8/25/2021 12:39:38 PM                                                                                    2D ECHO:  < from: TTE Echo Complete w/o Contrast w/ Doppler (08.23.21 @ 08:22) >  Summary:   1. Left ventricular ejection fraction, by visual estimation, is 55 to 60%.   2. Mild left ventricular hypertrophy.   3. Normal left atrial size.   4. Trace mitral valve regurgitation.   5. There is mild aortic root calcification.    LABS:                        13.6   7.89  )-----------( 216      ( 26 Aug 2021 05:21 )             41.0     08-26    140  |  103  |  19  ----------------------------<  114<H>  4.6   |  25  |  1.2    Ca    9.2      26 Aug 2021 05:21  Mg     2.0     08-26    TPro  6.6  /  Alb  3.9  /  TBili  0.8  /  DBili  x   /  AST  31  /  ALT  35  /  AlkPhos  81  08-26    Magnesium, Serum: 2.0 mg/dL [1.8 - 2.4] (08-26-21 @ 05:21)  LIVER FUNCTIONS - ( 26 Aug 2021 05:21 )  Alb: 3.9 g/dL / Pro: 6.6 g/dL / ALK PHOS: 81 U/L / ALT: 35 U/L / AST: 31 U/L / GGT: x           A/P:  I discussed the case with Cardiologist Dr. Pak and recommend the following:    S/P PCI:   Impression:  NSTEMI  Severe 1 vessel disease s/p PCI x2 (AUC 8)                       OOB to chair, ambulate                   Keep K = 4, Mg = 2                   will start ACEi/ARB as OP due to Cr and soft B/P    	     Continue DAPT ( Aspirin 81 mg PO Daily and Brilinta 90 mg PO q12 hr ), B-Blocker, Statin Therapy                   Patient pharmacy called in for Brilinta prescription and it is 19.50$ per month                   Patient agreeing to take DAPT for at least one year or as directed by cardiologist                    Pt given instructions on importance of taking antiplatelet medication or risk acute stent thrombosis/death                   Post cath instructions, access site care and activity restrictions reviewed with patient                     Discussed with patient to return to hospital if experience chest pain, shortness breath, dizziness and site bleeding                   Aggressive risk factor modification, diet counseling, smoking cessation discussed with patient                       Cardiac rehab information provided/ referral and communication to cardiac rehab provided                   Patient can be discharged home from Cardiology perspective. Follow up with Dr. Pak / Dr. Ware in 2 weeks as OP. Patient advised to call and make an appointment.

## 2021-08-30 DIAGNOSIS — I25.119 ATHEROSCLEROTIC HEART DISEASE OF NATIVE CORONARY ARTERY WITH UNSPECIFIED ANGINA PECTORIS: ICD-10-CM

## 2021-08-30 DIAGNOSIS — I45.10 UNSPECIFIED RIGHT BUNDLE-BRANCH BLOCK: ICD-10-CM

## 2021-08-30 DIAGNOSIS — Z79.82 LONG TERM (CURRENT) USE OF ASPIRIN: ICD-10-CM

## 2021-08-30 DIAGNOSIS — E78.00 PURE HYPERCHOLESTEROLEMIA, UNSPECIFIED: ICD-10-CM

## 2021-08-30 DIAGNOSIS — I21.4 NON-ST ELEVATION (NSTEMI) MYOCARDIAL INFARCTION: ICD-10-CM

## 2021-08-30 DIAGNOSIS — K21.9 GASTRO-ESOPHAGEAL REFLUX DISEASE WITHOUT ESOPHAGITIS: ICD-10-CM

## 2021-08-30 DIAGNOSIS — Z96.642 PRESENCE OF LEFT ARTIFICIAL HIP JOINT: ICD-10-CM

## 2021-08-30 DIAGNOSIS — I10 ESSENTIAL (PRIMARY) HYPERTENSION: ICD-10-CM

## 2021-08-30 DIAGNOSIS — E66.3 OVERWEIGHT: ICD-10-CM

## 2022-06-15 PROBLEM — E78.00 PURE HYPERCHOLESTEROLEMIA, UNSPECIFIED: Chronic | Status: ACTIVE | Noted: 2021-08-21

## 2022-06-15 PROBLEM — K21.9 GASTRO-ESOPHAGEAL REFLUX DISEASE WITHOUT ESOPHAGITIS: Chronic | Status: ACTIVE | Noted: 2021-08-21

## 2022-06-27 ENCOUNTER — APPOINTMENT (OUTPATIENT)
Dept: ORTHOPEDIC SURGERY | Facility: CLINIC | Age: 77
End: 2022-06-27

## 2022-06-27 DIAGNOSIS — M54.12 RADICULOPATHY, CERVICAL REGION: ICD-10-CM

## 2022-06-27 PROCEDURE — 20610 DRAIN/INJ JOINT/BURSA W/O US: CPT

## 2022-06-27 PROCEDURE — 99204 OFFICE O/P NEW MOD 45 MIN: CPT | Mod: 25

## 2022-06-27 PROCEDURE — 73030 X-RAY EXAM OF SHOULDER: CPT | Mod: RT

## 2022-07-08 NOTE — HISTORY OF PRESENT ILLNESS
[de-identified] :  pain at night when he sleeps right shoulder goes all the way down to his hand area, had rotator cuff surgery in 2013 for a cuff repair is currently on Pepcid for stomach issues

## 2022-07-08 NOTE — DATA REVIEWED
[FreeTextEntry1] :  x-rays of the shoulder taken today show some irregularity along the rotator cuff footprint slightly high-riding humeral head

## 2022-07-08 NOTE — PHYSICAL EXAM
[FreeTextEntry3] :  right shoulder has a noticeable pop by deformity pain weakness with rotator cuff resistance but full active assistive range of motion well-healed portal sites\par \par Neck has decreased range of motion with pain along the right trapezial area with range of motion

## 2022-07-08 NOTE — ASSESSMENT
[FreeTextEntry1] :  diagnosis is right shoulder rotator cuff injury recommend therapy and injection cortisone under sterile conditions was given\par \par Neck  diagnosis of cervical radiculopathy will recommend therapy, will hold off on anti-inflammatories due to his stomach issues, will a \par \par Will follow up in 6-8 weeks if his shoulder still symptomatic would recommend further imaging studies as well as of his neck is still symptomatic would recommend further imaging studies\par Patient has stomach issues recommend injection done under sterile conditions of cortisone into the shoulder

## 2022-07-08 NOTE — HISTORY OF PRESENT ILLNESS
[de-identified] :  pain at night when he sleeps right shoulder goes all the way down to his hand area, had rotator cuff surgery in 2013 for a cuff repair is currently on Pepcid for stomach issues

## 2022-08-29 ENCOUNTER — APPOINTMENT (OUTPATIENT)
Dept: ORTHOPEDIC SURGERY | Facility: CLINIC | Age: 77
End: 2022-08-29

## 2022-08-29 DIAGNOSIS — M75.100 UNSPECIFIED ROTATOR CUFF TEAR OR RUPTURE OF UNSPECIFIED SHOULDER, NOT SPECIFIED AS TRAUMATIC: ICD-10-CM

## 2022-08-29 PROCEDURE — 99214 OFFICE O/P EST MOD 30 MIN: CPT

## 2022-08-29 NOTE — HISTORY OF PRESENT ILLNESS
[de-identified] : Patient is here for follow-up on the right shoulder had injection as well as therapy and feels relief occasionally has pain on the distal 3rd of the arm around the elbow area comes in with his wife today does have a history of having surgery on that shoulder about 10 years ago\par \par  on exam is got good range of motion good strength with rotator cuff resistance minimal pain with rotator cuff resistance and impingement of the right shoulder\par \par  at this point since he is minimally symptomatic will continue with home therapy if he changes mind or if he has worsening pain and wants to get an MRI who call me otherwise will follow-up as needed for the right shoulder cuff injury

## 2024-03-06 NOTE — PATIENT PROFILE ADULT - NSPROPTRIGHTSUPPORTPERSON_GEN_A_NUR
Detail Level: Detailed Depth Of Biopsy: dermis Was A Bandage Applied: Yes Size Of Lesion In Cm: 0.4 X Size Of Lesion In Cm: 0 Biopsy Type: H and E Biopsy Method: Personna blade Anesthesia Type: 1% lidocaine without epinephrine Anesthesia Volume In Cc: 1 Hemostasis: Drysol Wound Care: Petrolatum Dressing: bandage Destruction After The Procedure: No Type Of Destruction Used: Curettage Curettage Text: The wound bed was treated with curettage after the biopsy was performed. Cryotherapy Text: The wound bed was treated with cryotherapy after the biopsy was performed. Electrodesiccation Text: The wound bed was treated with electrodesiccation after the biopsy was performed. Electrodesiccation And Curettage Text: The wound bed was treated with electrodesiccation and curettage after the biopsy was performed. Silver Nitrate Text: The wound bed was treated with silver nitrate after the biopsy was performed. Lab: 6 Lab Facility: 3 Consent: Verbal consent was obtained and risks were reviewed including but not limited to scarring, infection, bleeding, scabbing, incomplete removal, nerve damage and allergy to anesthesia. Post-Care Instructions: I reviewed with the patient in detail post-care instructions. Patient is to keep the biopsy site dry overnight, and then apply bacitracin twice daily until healed. Patient may apply hydrogen peroxide soaks to remove any crusting. Notification Instructions: Patient will be notified of biopsy results. However, patient instructed to call the office if not contacted within 2 weeks. Billing Type: Third-Party Bill Information: Selecting Yes will display possible errors in your note based on the variables you have selected. This validation is only offered as a suggestion for you. PLEASE NOTE THAT THE VALIDATION TEXT WILL BE REMOVED WHEN YOU FINALIZE YOUR NOTE. IF YOU WANT TO FAX A PRELIMINARY NOTE YOU WILL NEED TO TOGGLE THIS TO 'NO' IF YOU DO NOT WANT IT IN YOUR FAXED NOTE. same name as above

## 2025-05-23 ENCOUNTER — NON-APPOINTMENT (OUTPATIENT)
Age: 80
End: 2025-05-23

## 2025-05-23 DIAGNOSIS — J98.01 ACUTE BRONCHOSPASM: ICD-10-CM

## 2025-05-23 DIAGNOSIS — R94.2 ABNORMAL RESULTS OF PULMONARY FUNCTION STUDIES: ICD-10-CM

## 2025-05-23 DIAGNOSIS — Z80.8 FAMILY HISTORY OF MALIGNANT NEOPLASM OF OTHER ORGANS OR SYSTEMS: ICD-10-CM

## 2025-05-23 DIAGNOSIS — Z87.891 PERSONAL HISTORY OF NICOTINE DEPENDENCE: ICD-10-CM

## 2025-05-23 DIAGNOSIS — G47.10 HYPERSOMNIA, UNSPECIFIED: ICD-10-CM

## 2025-05-23 DIAGNOSIS — K21.9 GASTRO-ESOPHAGEAL REFLUX DISEASE W/OUT ESOPHAGITIS: ICD-10-CM

## 2025-05-23 DIAGNOSIS — Z82.49 FAMILY HISTORY OF ISCHEMIC HEART DISEASE AND OTHER DISEASES OF THE CIRCULATORY SYSTEM: ICD-10-CM

## 2025-05-23 RX ORDER — FAMOTIDINE 40 MG/1
40 TABLET, FILM COATED ORAL DAILY
Refills: 0 | Status: ACTIVE | COMMUNITY

## 2025-06-09 RX ORDER — FLUTICASONE PROPIONATE 50 UG/1
50 SPRAY NASAL DAILY
Qty: 3 | Refills: 3 | Status: ACTIVE | COMMUNITY
Start: 2025-06-09 | End: 1900-01-01

## 2025-08-19 ENCOUNTER — APPOINTMENT (OUTPATIENT)
Age: 80
End: 2025-08-19
Payer: MEDICARE

## 2025-08-19 VITALS
DIASTOLIC BLOOD PRESSURE: 71 MMHG | HEIGHT: 66 IN | WEIGHT: 187 LBS | OXYGEN SATURATION: 98 % | SYSTOLIC BLOOD PRESSURE: 118 MMHG | RESPIRATION RATE: 14 BRPM | TEMPERATURE: 98.1 F | HEART RATE: 67 BPM | BODY MASS INDEX: 30.05 KG/M2

## 2025-08-19 DIAGNOSIS — R05.3 CHRONIC COUGH: ICD-10-CM

## 2025-08-19 DIAGNOSIS — K21.9 GASTRO-ESOPHAGEAL REFLUX DISEASE W/OUT ESOPHAGITIS: ICD-10-CM

## 2025-08-19 DIAGNOSIS — Z87.891 PERSONAL HISTORY OF NICOTINE DEPENDENCE: ICD-10-CM

## 2025-08-19 DIAGNOSIS — J31.0 CHRONIC RHINITIS: ICD-10-CM

## 2025-08-19 DIAGNOSIS — J42 UNSPECIFIED CHRONIC BRONCHITIS: ICD-10-CM

## 2025-08-19 DIAGNOSIS — R06.83 SNORING: ICD-10-CM

## 2025-08-19 PROCEDURE — 99213 OFFICE O/P EST LOW 20 MIN: CPT

## 2025-08-19 PROCEDURE — 99203 OFFICE O/P NEW LOW 30 MIN: CPT
